# Patient Record
Sex: FEMALE | Race: WHITE | NOT HISPANIC OR LATINO | Employment: FULL TIME | URBAN - METROPOLITAN AREA
[De-identification: names, ages, dates, MRNs, and addresses within clinical notes are randomized per-mention and may not be internally consistent; named-entity substitution may affect disease eponyms.]

---

## 2017-12-16 ENCOUNTER — OFFICE VISIT (OUTPATIENT)
Dept: URGENT CARE | Facility: CLINIC | Age: 47
End: 2017-12-16
Payer: COMMERCIAL

## 2017-12-16 VITALS
HEART RATE: 91 BPM | BODY MASS INDEX: 25.33 KG/M2 | OXYGEN SATURATION: 98 % | TEMPERATURE: 98 F | RESPIRATION RATE: 18 BRPM | DIASTOLIC BLOOD PRESSURE: 84 MMHG | SYSTOLIC BLOOD PRESSURE: 119 MMHG | WEIGHT: 152 LBS | HEIGHT: 65 IN

## 2017-12-16 DIAGNOSIS — J32.4 PANSINUSITIS, UNSPECIFIED CHRONICITY: Primary | ICD-10-CM

## 2017-12-16 DIAGNOSIS — R05.9 COUGH: ICD-10-CM

## 2017-12-16 LAB
CTP QC/QA: YES
CTP QC/QA: YES
FLUAV AG NPH QL: NEGATIVE
FLUBV AG NPH QL: NEGATIVE
S PYO RRNA THROAT QL PROBE: NEGATIVE

## 2017-12-16 PROCEDURE — 87880 STREP A ASSAY W/OPTIC: CPT | Mod: QW,S$GLB,, | Performed by: NURSE PRACTITIONER

## 2017-12-16 PROCEDURE — 96372 THER/PROPH/DIAG INJ SC/IM: CPT | Mod: S$GLB,,, | Performed by: EMERGENCY MEDICINE

## 2017-12-16 PROCEDURE — 87804 INFLUENZA ASSAY W/OPTIC: CPT | Mod: 59,QW,S$GLB, | Performed by: NURSE PRACTITIONER

## 2017-12-16 PROCEDURE — 99203 OFFICE O/P NEW LOW 30 MIN: CPT | Mod: 25,S$GLB,, | Performed by: NURSE PRACTITIONER

## 2017-12-16 RX ORDER — PROMETHAZINE HYDROCHLORIDE AND DEXTROMETHORPHAN HYDROBROMIDE 6.25; 15 MG/5ML; MG/5ML
5 SYRUP ORAL
Qty: 118 ML | Refills: 0 | Status: SHIPPED | OUTPATIENT
Start: 2017-12-16

## 2017-12-16 RX ORDER — AZITHROMYCIN 250 MG/1
TABLET, FILM COATED ORAL
Qty: 6 TABLET | Refills: 0 | Status: SHIPPED | OUTPATIENT
Start: 2017-12-16 | End: 2018-10-22 | Stop reason: ALTCHOICE

## 2017-12-16 RX ORDER — BETAMETHASONE SODIUM PHOSPHATE AND BETAMETHASONE ACETATE 3; 3 MG/ML; MG/ML
6 INJECTION, SUSPENSION INTRA-ARTICULAR; INTRALESIONAL; INTRAMUSCULAR; SOFT TISSUE
Status: COMPLETED | OUTPATIENT
Start: 2017-12-16 | End: 2017-12-16

## 2017-12-16 RX ADMIN — BETAMETHASONE SODIUM PHOSPHATE AND BETAMETHASONE ACETATE 6 MG: 3; 3 INJECTION, SUSPENSION INTRA-ARTICULAR; INTRALESIONAL; INTRAMUSCULAR; SOFT TISSUE at 11:12

## 2017-12-16 NOTE — PROGRESS NOTES
"Subjective:       Patient ID: Irasema Brand is a 47 y.o. female.    Vitals:  height is 5' 5" (1.651 m) and weight is 68.9 kg (152 lb). Her tympanic temperature is 97.8 °F (36.6 °C). Her blood pressure is 119/84 and her pulse is 91. Her respiration is 18 and oxygen saturation is 98%.     Chief Complaint: Sore Throat (congestion headache since 3-4 days )    Present with cough and sore throat for 3-4 days.        Sore Throat    This is a new problem. The current episode started in the past 7 days (3-4 days ). The problem has been gradually improving. There has been no fever. Associated symptoms include congestion, coughing, headaches, a hoarse voice and trouble swallowing. Pertinent negatives include no abdominal pain, ear pain or shortness of breath. Treatments tried: sudafed mucinex and excedrine  The treatment provided mild relief.     Review of Systems   Constitution: Positive for malaise/fatigue. Negative for chills and fever.   HENT: Positive for congestion, hoarse voice, sore throat and trouble swallowing. Negative for ear pain.    Eyes: Negative for discharge and redness.   Cardiovascular: Negative for chest pain, dyspnea on exertion and leg swelling.   Respiratory: Positive for cough. Negative for shortness of breath, sputum production and wheezing.    Musculoskeletal: Positive for myalgias.   Gastrointestinal: Negative for abdominal pain and nausea.   Neurological: Positive for headaches.       Objective:      Physical Exam   Constitutional: She is oriented to person, place, and time. She appears well-developed and well-nourished. She is cooperative.  Non-toxic appearance. She does not appear ill. No distress.   HENT:   Head: Normocephalic and atraumatic.   Right Ear: Hearing, external ear and ear canal normal. Tympanic membrane is injected.   Left Ear: Hearing, external ear and ear canal normal. Tympanic membrane is injected.   Nose: Mucosal edema present. No rhinorrhea or nasal deformity. No epistaxis. " Right sinus exhibits maxillary sinus tenderness and frontal sinus tenderness. Left sinus exhibits maxillary sinus tenderness and frontal sinus tenderness.   Mouth/Throat: Uvula is midline and mucous membranes are normal. No trismus in the jaw. Normal dentition. No uvula swelling. Posterior oropharyngeal erythema (MILD WITH COBBLESTONING) present.   Eyes: Conjunctivae and lids are normal. No scleral icterus.   Sclera clear bilat   Neck: Trachea normal, full passive range of motion without pain and phonation normal. Neck supple.   Cardiovascular: Normal rate, regular rhythm, normal heart sounds, intact distal pulses and normal pulses.    Pulmonary/Chest: Effort normal and breath sounds normal. No respiratory distress.   Abdominal: Soft. Normal appearance and bowel sounds are normal. She exhibits no distension. There is no tenderness.   Musculoskeletal: Normal range of motion. She exhibits no edema or deformity.   Neurological: She is alert and oriented to person, place, and time. She exhibits normal muscle tone. Coordination normal.   Skin: Skin is warm, dry and intact. She is not diaphoretic. No pallor.   Psychiatric: She has a normal mood and affect. Her speech is normal and behavior is normal. Judgment and thought content normal. Cognition and memory are normal.   Nursing note and vitals reviewed.      Office Visit on 12/16/2017   Component Date Value Ref Range Status    Rapid Strep A Screen 12/16/2017 Negative  Negative Final     Acceptable 12/16/2017 Yes   Final    Rapid Influenza A Ag 12/16/2017 Negative  Negative Final    Rapid Influenza B Ag 12/16/2017 Negative  Negative Final     Acceptable 12/16/2017 Yes   Final     Assessment:       1. Pansinusitis, unspecified chronicity    2. Cough        Plan:         Pansinusitis, unspecified chronicity  -     betamethasone acetate-betamethasone sodium phosphate injection 6 mg; Inject 1 mL (6 mg total) into the muscle one time.  -      promethazine-dextromethorphan (PROMETHAZINE-DM) 6.25-15 mg/5 mL Syrp; Take 5 mLs by mouth every 4 to 6 hours as needed (COUGH).  Dispense: 118 mL; Refill: 0  -     azithromycin (ZITHROMAX Z-AMILCAR) 250 MG tablet; TAKE 2 TABLETS ON DAY 1, THEN 1 TABLET DAILY X 4 DAYS.  Dispense: 6 tablet; Refill: 0    Cough  -     POCT rapid strep A  -     POCT Influenza A/B      Patient Instructions   Please drink plenty of fluids.  Please get plenty of rest.  Please return here or go to the Emergency Department for any concerns or worsening of condition.  If you were given wait & see antibiotics, please wait 3-5 days before taking them, and only take them if your symptoms have worsened or not improved.  If you do begin taking the antibiotics, please take them to completion.  If you were prescribed antibiotics, please take them to completion.  If you were prescribed a narcotic medication, do not drive or operate heavy equipment or machinery while taking these medications.  If you do not have Hypertension or any history of palpitations, it is ok to take over the counter Sudafed or Mucinex D or Allegra-D or Claritin-D or Zyrtec-D.  If you do take one of the above, it is ok to combine that with plain over the counter Mucinex or Allegra or Claritin or Zyrtec.  If for example you are taking Zyrtec -D, you can combine that with Mucinex, but not Mucinex-D.  If you are taking Mucinex-D, you can combine that with plain Allegra or Claritin or Zyrtec.   If you do have Hypertension or palpitations, it is safe to take Coricidin HBP for relief of sinus symptoms.  We recommend you take over the counter Flonase (Fluticasone) or another nasally inhaled steroid unless you are already taking one.  Nasal irrigation with a saline spray or Netti Pot like device per their directions is also recommended.  If not allergic, please take over the counter Tylenol (Acetaminophen) and/or Motrin (Ibuprofen) as directed for control of pain and/or fever.  Please follow  up with your primary care doctor or specialist as needed.    If you  smoke, please stop smoking.  Sinusitis (Antibiotic Treatment)    The sinuses are air-filled spaces within the bones of the face. They connect to the inside of the nose. Sinusitis is an inflammation of the tissue lining the sinus cavity. Sinus inflammation can occur during a cold. It can also be due to allergies to pollens and other particles in the air. Sinusitis can cause symptoms of sinus congestion and fullness. A sinus infection causes fever, headache and facial pain. There is often green or yellow drainage from the nose or into the back of the throat (post-nasal drip). You have been given antibiotics to treat this condition.  Home care:  · Take the full course of antibiotics as instructed. Do not stop taking them, even if you feel better.  · Drink plenty of water, hot tea, and other liquids. This may help thin mucus. It also may promote sinus drainage.  · Heat may help soothe painful areas of the face. Use a towel soaked in hot water. Or,  the shower and direct the hot spray onto your face. Using a vaporizer along with a menthol rub at night may also help.   · An expectorant containing guaifenesin may help thin the mucus and promote drainage from the sinuses.  · Over-the-counter decongestants may be used unless a similar medicine was prescribed. Nasal sprays work the fastest. Use one that contains phenylephrine or oxymetazoline. First blow the nose gently. Then use the spray. Do not use these medicines more often than directed on the label or symptoms may get worse. You may also use tablets containing pseudoephedrine. Avoid products that combine ingredients, because side effects may be increased. Read labels. You can also ask the pharmacist for help. (NOTE: Persons with high blood pressure should not use decongestants. They can raise blood pressure.)  · Over-the-counter antihistamines may help if allergies contributed to your  sinusitis.    · Do not use nasal rinses or irrigation during an acute sinus infection, unless told to by your health care provider. Rinsing may spread the infection to other sinuses.  · Use acetaminophen or ibuprofen to control pain, unless another pain medicine was prescribed. (If you have chronic liver or kidney disease or ever had a stomach ulcer, talk with your doctor before using these medicines. Aspirin should never be used in anyone under 18 years of age who is ill with a fever. It may cause severe liver damage.)  · Don't smoke. This can worsen symptoms.  Follow-up care  Follow up with your healthcare provider or our staff if you are not improving within the next week.  When to seek medical advice  Call your healthcare provider if any of these occur:  · Facial pain or headache becoming more severe  · Stiff neck  · Unusual drowsiness or confusion  · Swelling of the forehead or eyelids  · Vision problems, including blurred or double vision  · Fever of 100.4ºF (38ºC) or higher, or as directed by your healthcare provider  · Seizure  · Breathing problems  · Symptoms not resolving within 10 days  Date Last Reviewed: 4/13/2015  © 9552-9913 [employer]. [address] [place] [address]. All rights reserved. This information is not intended as a substitute for professional medical care. Always follow your healthcare professional's instructions.

## 2017-12-16 NOTE — PATIENT INSTRUCTIONS
Please drink plenty of fluids.  Please get plenty of rest.  Please return here or go to the Emergency Department for any concerns or worsening of condition.  If you were given wait & see antibiotics, please wait 3-5 days before taking them, and only take them if your symptoms have worsened or not improved.  If you do begin taking the antibiotics, please take them to completion.  If you were prescribed antibiotics, please take them to completion.  If you were prescribed a narcotic medication, do not drive or operate heavy equipment or machinery while taking these medications.  If you do not have Hypertension or any history of palpitations, it is ok to take over the counter Sudafed or Mucinex D or Allegra-D or Claritin-D or Zyrtec-D.  If you do take one of the above, it is ok to combine that with plain over the counter Mucinex or Allegra or Claritin or Zyrtec.  If for example you are taking Zyrtec -D, you can combine that with Mucinex, but not Mucinex-D.  If you are taking Mucinex-D, you can combine that with plain Allegra or Claritin or Zyrtec.   If you do have Hypertension or palpitations, it is safe to take Coricidin HBP for relief of sinus symptoms.  We recommend you take over the counter Flonase (Fluticasone) or another nasally inhaled steroid unless you are already taking one.  Nasal irrigation with a saline spray or Netti Pot like device per their directions is also recommended.  If not allergic, please take over the counter Tylenol (Acetaminophen) and/or Motrin (Ibuprofen) as directed for control of pain and/or fever.  Please follow up with your primary care doctor or specialist as needed.    If you  smoke, please stop smoking.  Sinusitis (Antibiotic Treatment)    The sinuses are air-filled spaces within the bones of the face. They connect to the inside of the nose. Sinusitis is an inflammation of the tissue lining the sinus cavity. Sinus inflammation can occur during a cold. It can also be due to allergies to  pollens and other particles in the air. Sinusitis can cause symptoms of sinus congestion and fullness. A sinus infection causes fever, headache and facial pain. There is often green or yellow drainage from the nose or into the back of the throat (post-nasal drip). You have been given antibiotics to treat this condition.  Home care:  · Take the full course of antibiotics as instructed. Do not stop taking them, even if you feel better.  · Drink plenty of water, hot tea, and other liquids. This may help thin mucus. It also may promote sinus drainage.  · Heat may help soothe painful areas of the face. Use a towel soaked in hot water. Or,  the shower and direct the hot spray onto your face. Using a vaporizer along with a menthol rub at night may also help.   · An expectorant containing guaifenesin may help thin the mucus and promote drainage from the sinuses.  · Over-the-counter decongestants may be used unless a similar medicine was prescribed. Nasal sprays work the fastest. Use one that contains phenylephrine or oxymetazoline. First blow the nose gently. Then use the spray. Do not use these medicines more often than directed on the label or symptoms may get worse. You may also use tablets containing pseudoephedrine. Avoid products that combine ingredients, because side effects may be increased. Read labels. You can also ask the pharmacist for help. (NOTE: Persons with high blood pressure should not use decongestants. They can raise blood pressure.)  · Over-the-counter antihistamines may help if allergies contributed to your sinusitis.    · Do not use nasal rinses or irrigation during an acute sinus infection, unless told to by your health care provider. Rinsing may spread the infection to other sinuses.  · Use acetaminophen or ibuprofen to control pain, unless another pain medicine was prescribed. (If you have chronic liver or kidney disease or ever had a stomach ulcer, talk with your doctor before using these  medicines. Aspirin should never be used in anyone under 18 years of age who is ill with a fever. It may cause severe liver damage.)  · Don't smoke. This can worsen symptoms.  Follow-up care  Follow up with your healthcare provider or our staff if you are not improving within the next week.  When to seek medical advice  Call your healthcare provider if any of these occur:  · Facial pain or headache becoming more severe  · Stiff neck  · Unusual drowsiness or confusion  · Swelling of the forehead or eyelids  · Vision problems, including blurred or double vision  · Fever of 100.4ºF (38ºC) or higher, or as directed by your healthcare provider  · Seizure  · Breathing problems  · Symptoms not resolving within 10 days  Date Last Reviewed: 4/13/2015  © 7829-9863 [employer]. [address] [place] [address], [place] [address]. All rights reserved. This information is not intended as a substitute for professional medical care. Always follow your healthcare professional's instructions.

## 2018-10-22 ENCOUNTER — OFFICE VISIT (OUTPATIENT)
Dept: URGENT CARE | Facility: CLINIC | Age: 48
End: 2018-10-22
Payer: COMMERCIAL

## 2018-10-22 VITALS
RESPIRATION RATE: 18 BRPM | HEIGHT: 65 IN | HEART RATE: 74 BPM | WEIGHT: 155 LBS | SYSTOLIC BLOOD PRESSURE: 112 MMHG | BODY MASS INDEX: 25.83 KG/M2 | OXYGEN SATURATION: 99 % | TEMPERATURE: 99 F | DIASTOLIC BLOOD PRESSURE: 69 MMHG

## 2018-10-22 DIAGNOSIS — S52.502A CLOSED FRACTURE OF DISTAL END OF LEFT RADIUS, UNSPECIFIED FRACTURE MORPHOLOGY, INITIAL ENCOUNTER: Primary | ICD-10-CM

## 2018-10-22 PROCEDURE — 99214 OFFICE O/P EST MOD 30 MIN: CPT | Mod: 25,S$GLB,, | Performed by: PHYSICIAN ASSISTANT

## 2018-10-22 PROCEDURE — 73110 X-RAY EXAM OF WRIST: CPT | Mod: LT,S$GLB,, | Performed by: RADIOLOGY

## 2018-10-22 PROCEDURE — 29105 APPLICATION LONG ARM SPLINT: CPT | Mod: LT,S$GLB,, | Performed by: PHYSICIAN ASSISTANT

## 2018-10-22 RX ORDER — OMEPRAZOLE 40 MG/1
CAPSULE, DELAYED RELEASE ORAL
COMMUNITY

## 2018-10-22 NOTE — PATIENT INSTRUCTIONS
- Rest.    - Drink plenty of fluids.    - Tylenol or Ibuprofen as directed as needed for fever/pain.    - Ice for the next 24-48 hours.    - Elevate when possible.    - Wear splint until your orthopedic follow up appointment.  - Follow up with your PCP or specialty clinic as directed in the next 1-2 weeks if not improved or as needed.  You can call [phone] to schedule an appointment with the appropriate provider.    - Go to the ED if your symptoms worsen.  - You must understand that you have received an Urgent Care treatment only and that you may be released before all of your medical problems are known or treated.   - You, the patient, will arrange for follow up care as instructed.   - If your condition worsens or fails to improve we recommend that you receive another evaluation at the ER immediately or contact your PCP to discuss your concerns or return here.       Understanding a Distal Radius Fracture      A fracture is a broken bone. A fracture in the distal radius is a break in the lower end of the radius. This is the larger bone in the forearm. Because the break occurs near the wrist, it is often called a wrist fracture.    The bone may be cracked, or it may be broken into 2 or more pieces. The pieces of bone may be lined up or they may have moved out of place. Sometimes, the bone may break through the skin. Nearby nerves, tissues, and joints also may be damaged. Depending on the severity of the fracture, healing may take several months or longer.  What causes a distal radius fracture?  This type of fracture is most often caused from a fall on an outstretched hand. It can also be caused from a blow, accident, or sports injury.  Symptoms of a distal radius fracture  Symptoms can include pain, swelling, and bruising. If the bone breaks through the skin, external bleeding can also occur. The wrist may look crooked, deformed, or bent. It may be hard to move or use the arm, wrist, and hand for normal tasks  and activities.  Treating a distal radius fracture  Treatment depends on how serious the fracture is. If needed, the bone is put back into place. This may be done with or without surgery. If surgery is needed, the surgeon may use devices such as pins, plates, or screws to hold the bone together. You may need to wear a splint or cast for a month or longer to protect the bone and keep it in place during healing. Other treatments may be also used to help reduce symptoms or regain function. These include:  · Cold packs. Putting an ice pack on the injured area may help reduce swelling and pain.  · Raising the arm and wrist. Keeping the arm and wrist raised above heart level may help reduce swelling.  · Pain medicines. Taking prescription or over-the-counter pain medicines may help reduce pain and swelling.  · Exercises. Doing certain exercises at home or with a physical therapist can help restore strength, flexibility, and range of motion in your arm, wrist, and hand. In general, exercises are not started until after the splint or cast is removed.  Possible complications of a distal radius fracture  These can include:  · Poor healing of the bone  · Weakness, stiffness, or loss of range of motion in the arm, wrist, or hand  · Osteoarthritis in the wrist joint  When to call your healthcare provider  Call your healthcare provider right away if you have any of these:  · Fever of 100.4°F (38°C) or higher, or as directed  · Symptoms that dont get better with treatment, or get worse  · Numbness, coldness, or swelling in your arm, hand, or fingers  · Fingernails that turn blue or gray in color  · A splint or cast that is damaged or feels too tight or loose  · New symptoms   Date Last Reviewed: 3/10/2016  © 8406-4908 [employer]. [address] [place] [address]. All rights reserved. This information is not intended as a substitute for professional medical care. Always follow your healthcare professional's  instructions.        Upper Extremity Fracture    You have a break (fracture) of the arm, wrist, or hand. This may be a small crack in the bone. Or it may be a major break, with the broken parts pushed out of position. Most fractures will heal without surgery. But you may need surgery if the bones are far out of place or if the break is near the elbow. Treatment is with a special sling called a shoulder immobilizer, or a splint or cast, depending on the type of fracture and where the fracture is. This fracture takes 4 to 6 weeks or longer to heal. The cast may need to be changed in 2 to 3 weeks as swelling goes down.  Home care  Follow these guidelines when caring for yourself:  · If you were given a shoulder immobilizer, leave it in place. This will support the injured arm at your side. This is the best position for bone healing. The shoulder immobilizer can be adjusted. If it becomes loose, adjust it so that your forearm is level with the ground (horizontal). Your hand should be level with your elbow.  · Put an ice pack on the injured area. Do this for 20 minutes every 1 to 2 hours the first day for pain relief. You can make an ice pack by wrapping a plastic bag of ice cubes in a thin towel. As the ice melts, be careful that the cast/splint/sling doesnt get wet. You can put the ice pack inside the sling and directly over the splint or cast. Continue using the ice pack 3 to 4 times a day for the next 2 days. Then use the ice pack as needed to ease pain and swelling.  · Keep the cast, splint, or sling completely dry at all times. Bathe with your cast, splint, or sling out of the water. Protect it with a large plastic bag, rubber-banded at the top end. If a fiberglass cast, splint, or sling gets wet, you can dry it with a hair dryer.  · You may use acetaminophen or ibuprofen to control pain, unless another pain medicine was prescribed. If you have chronic liver or kidney disease, talk with your healthcare provider  before using these medicines. Also talk with your provider if youve had a stomach ulcer or gastrointestinal bleeding.  · Dont put creams or objects under the cast if you have itching.  Follow-up care  Follow up with your healthcare provider, or as advised. This is to make sure the bone is healing the way it should.  X-rays may be taken. You will be told of any new findings that may affect your care.  When to seek medical advice  Call your health care provider right away if any of these occur:  · The cast or splint cracks  · The plaster cast or splint becomes wet or soft  · The fiberglass cast or splint stays wet for more than 24 hours  · Bad odor from the cast or wound fluid stains the cast  · Tightness or pain under the cast or splint gets worse  · Fingers become swollen, cold, blue, numb, or tingly  · You cant move your fingers  · Skin around cast or splint becomes red  · Fever of 100.4ºF (38ºC) or higher, or as directed by your healthcare provider  Date Last Reviewed: 2/1/2017 © 2000-2017 [employer]. [address] [place] [address], [place] [address]. All rights reserved. This information is not intended as a substitute for professional medical care. Always follow your healthcare professional's instructions.

## 2018-10-22 NOTE — PROGRESS NOTES
"Subjective:       Patient ID: Irasema Brand is a 48 y.o. female.    Vitals:  height is 5' 5" (1.651 m) and weight is 70.3 kg (155 lb). Her oral temperature is 98.9 °F (37.2 °C). Her blood pressure is 112/69 and her pulse is 74. Her respiration is 18 and oxygen saturation is 99%.     Chief Complaint: Wrist Pain    This is a 48 y.o. female who presents today with a chief complaint of left wrist pain after slipping on wet tiles around her pool Saturday. Ice applied continously and she took a oxycodone.      Wrist Pain    The pain is present in the left wrist. This is a new problem. The current episode started in the past 7 days. The problem occurs constantly. The problem has been gradually worsening. The quality of the pain is described as aching. The pain is at a severity of 7/10. The pain is moderate. Associated symptoms include joint swelling, a limited range of motion and stiffness. Pertinent negatives include no numbness. The symptoms are aggravated by activity. She has tried cold and oral narcotics for the symptoms. The treatment provided mild relief. Her past medical history is significant for osteoarthritis.     Review of Systems   Constitution: Negative for weakness and malaise/fatigue.   HENT: Negative for nosebleeds.    Cardiovascular: Negative for chest pain and syncope.   Respiratory: Negative for shortness of breath.    Musculoskeletal: Positive for falls, joint pain, joint swelling, myalgias and stiffness. Negative for back pain and neck pain.   Gastrointestinal: Negative for abdominal pain.   Genitourinary: Negative for hematuria.   Neurological: Negative for dizziness and numbness.       Objective:      Physical Exam   Constitutional: She is oriented to person, place, and time. She appears well-developed and well-nourished.   HENT:   Head: Normocephalic and atraumatic.   Eyes: Conjunctivae are normal.   Neck: Normal range of motion. Neck supple. No thyromegaly present.   Cardiovascular: Normal " rate and regular rhythm. Exam reveals no gallop and no friction rub.   No murmur heard.  Pulmonary/Chest: Effort normal and breath sounds normal. She has no wheezes. She has no rales.   Musculoskeletal:        Left elbow: She exhibits normal range of motion. No tenderness found.        Left wrist: She exhibits decreased range of motion, bony tenderness and swelling. She exhibits no deformity.   Lymphadenopathy:     She has no cervical adenopathy.   Neurological: She is alert and oriented to person, place, and time.   Skin: Skin is warm and dry. No rash noted. No erythema.   Psychiatric: She has a normal mood and affect. Her behavior is normal. Judgment and thought content normal.   Nursing note and vitals reviewed.      8:59 AM - xray of the left wrist shows a nondisplaced fracture of the distal radius.  She was placed in a sugartong splint.    X-ray Wrist Complete Left    Result Date: 10/22/2018  EXAMINATION: XR WRIST COMPLETE 3 VIEWS LEFT CLINICAL HISTORY: Injury, unspecified, initial encounter TECHNIQUE: PA, lateral, and oblique views of the left wrist were performed. COMPARISON: None FINDINGS: There is an incomplete vertical intra-articular fracture of the distal metaphysis of the radius.  No dislocation of the radiocarpal joint.  The ulna is intact.  There is soft tissue swelling especially over the volar aspect of the wrist joint as well as minimal soft tissue swelling over the dorsum of the wrist.  No carpal bone fractures.     1. Acute intra-articular distal radial fracture without significant displacements as above. This report was flagged in Epic as abnormal. Electronically signed by: Gautam Mejia MD Date:    10/22/2018 Time:    09:04      Assessment:       1. Closed fracture of distal end of left radius, unspecified fracture morphology, initial encounter        Plan:         Closed fracture of distal end of left radius, unspecified fracture morphology, initial encounter  -     X-Ray Wrist Complete Left;  Future; Expected date: 10/22/2018  -     Ambulatory referral to Orthopedics        Irasema was seen today for wrist pain.    Diagnoses and all orders for this visit:    Closed fracture of distal end of left radius, unspecified fracture morphology, initial encounter  -     X-Ray Wrist Complete Left; Future  -     Ambulatory referral to Orthopedics      Patient Instructions     - Rest.    - Drink plenty of fluids.    - Tylenol or Ibuprofen as directed as needed for fever/pain.    - Ice for the next 24-48 hours.    - Elevate when possible.    - Wear splint until your orthopedic follow up appointment.  - Follow up with your PCP or specialty clinic as directed in the next 1-2 weeks if not improved or as needed.  You can call [phone] to schedule an appointment with the appropriate provider.    - Go to the ED if your symptoms worsen.  - You must understand that you have received an Urgent Care treatment only and that you may be released before all of your medical problems are known or treated.   - You, the patient, will arrange for follow up care as instructed.   - If your condition worsens or fails to improve we recommend that you receive another evaluation at the ER immediately or contact your PCP to discuss your concerns or return here.       Understanding a Distal Radius Fracture      A fracture is a broken bone. A fracture in the distal radius is a break in the lower end of the radius. This is the larger bone in the forearm. Because the break occurs near the wrist, it is often called a wrist fracture.    The bone may be cracked, or it may be broken into 2 or more pieces. The pieces of bone may be lined up or they may have moved out of place. Sometimes, the bone may break through the skin. Nearby nerves, tissues, and joints also may be damaged. Depending on the severity of the fracture, healing may take several months or longer.  What causes a distal radius fracture?  This type of fracture is most often caused from  a fall on an outstretched hand. It can also be caused from a blow, accident, or sports injury.  Symptoms of a distal radius fracture  Symptoms can include pain, swelling, and bruising. If the bone breaks through the skin, external bleeding can also occur. The wrist may look crooked, deformed, or bent. It may be hard to move or use the arm, wrist, and hand for normal tasks and activities.  Treating a distal radius fracture  Treatment depends on how serious the fracture is. If needed, the bone is put back into place. This may be done with or without surgery. If surgery is needed, the surgeon may use devices such as pins, plates, or screws to hold the bone together. You may need to wear a splint or cast for a month or longer to protect the bone and keep it in place during healing. Other treatments may be also used to help reduce symptoms or regain function. These include:  · Cold packs. Putting an ice pack on the injured area may help reduce swelling and pain.  · Raising the arm and wrist. Keeping the arm and wrist raised above heart level may help reduce swelling.  · Pain medicines. Taking prescription or over-the-counter pain medicines may help reduce pain and swelling.  · Exercises. Doing certain exercises at home or with a physical therapist can help restore strength, flexibility, and range of motion in your arm, wrist, and hand. In general, exercises are not started until after the splint or cast is removed.  Possible complications of a distal radius fracture  These can include:  · Poor healing of the bone  · Weakness, stiffness, or loss of range of motion in the arm, wrist, or hand  · Osteoarthritis in the wrist joint  When to call your healthcare provider  Call your healthcare provider right away if you have any of these:  · Fever of 100.4°F (38°C) or higher, or as directed  · Symptoms that dont get better with treatment, or get worse  · Numbness, coldness, or swelling in your arm, hand, or  fingers  · Fingernails that turn blue or gray in color  · A splint or cast that is damaged or feels too tight or loose  · New symptoms   Date Last Reviewed: 3/10/2016  © 9594-2763 [employer]. [address] [place] [address], [place] [address]. All rights reserved. This information is not intended as a substitute for professional medical care. Always follow your healthcare professional's instructions.        Upper Extremity Fracture    You have a break (fracture) of the arm, wrist, or hand. This may be a small crack in the bone. Or it may be a major break, with the broken parts pushed out of position. Most fractures will heal without surgery. But you may need surgery if the bones are far out of place or if the break is near the elbow. Treatment is with a special sling called a shoulder immobilizer, or a splint or cast, depending on the type of fracture and where the fracture is. This fracture takes 4 to 6 weeks or longer to heal. The cast may need to be changed in 2 to 3 weeks as swelling goes down.  Home care  Follow these guidelines when caring for yourself:  · If you were given a shoulder immobilizer, leave it in place. This will support the injured arm at your side. This is the best position for bone healing. The shoulder immobilizer can be adjusted. If it becomes loose, adjust it so that your forearm is level with the ground (horizontal). Your hand should be level with your elbow.  · Put an ice pack on the injured area. Do this for 20 minutes every 1 to 2 hours the first day for pain relief. You can make an ice pack by wrapping a plastic bag of ice cubes in a thin towel. As the ice melts, be careful that the cast/splint/sling doesnt get wet. You can put the ice pack inside the sling and directly over the splint or cast. Continue using the ice pack 3 to 4 times a day for the next 2 days. Then use the ice pack as needed to ease pain and swelling.  · Keep the cast, splint, or sling completely dry at all  times. Bathe with your cast, splint, or sling out of the water. Protect it with a large plastic bag, rubber-banded at the top end. If a fiberglass cast, splint, or sling gets wet, you can dry it with a hair dryer.  · You may use acetaminophen or ibuprofen to control pain, unless another pain medicine was prescribed. If you have chronic liver or kidney disease, talk with your healthcare provider before using these medicines. Also talk with your provider if youve had a stomach ulcer or gastrointestinal bleeding.  · Dont put creams or objects under the cast if you have itching.  Follow-up care  Follow up with your healthcare provider, or as advised. This is to make sure the bone is healing the way it should.  X-rays may be taken. You will be told of any new findings that may affect your care.  When to seek medical advice  Call your health care provider right away if any of these occur:  · The cast or splint cracks  · The plaster cast or splint becomes wet or soft  · The fiberglass cast or splint stays wet for more than 24 hours  · Bad odor from the cast or wound fluid stains the cast  · Tightness or pain under the cast or splint gets worse  · Fingers become swollen, cold, blue, numb, or tingly  · You cant move your fingers  · Skin around cast or splint becomes red  · Fever of 100.4ºF (38ºC) or higher, or as directed by your healthcare provider  Date Last Reviewed: 2/1/2017 © 2000-2017 [employer]. [address] [place] [address], [place] [address]. All rights reserved. This information is not intended as a substitute for professional medical care. Always follow your healthcare professional's instructions.

## 2018-10-25 ENCOUNTER — HOSPITAL ENCOUNTER (OUTPATIENT)
Dept: RADIOLOGY | Facility: HOSPITAL | Age: 48
Discharge: HOME | End: 2018-10-25
Attending: ORTHOPAEDIC SURGERY
Payer: COMMERCIAL

## 2018-10-25 ENCOUNTER — OFFICE VISIT (OUTPATIENT)
Dept: ORTHOPEDICS | Facility: CLINIC | Age: 48
End: 2018-10-25
Payer: COMMERCIAL

## 2018-10-25 VITALS
WEIGHT: 158.19 LBS | DIASTOLIC BLOOD PRESSURE: 79 MMHG | HEIGHT: 65 IN | BODY MASS INDEX: 26.35 KG/M2 | HEART RATE: 71 BPM | SYSTOLIC BLOOD PRESSURE: 123 MMHG

## 2018-10-25 DIAGNOSIS — S52.572A OTHER CLOSED INTRA-ARTICULAR FRACTURE OF DISTAL END OF LEFT RADIUS, INITIAL ENCOUNTER: Primary | ICD-10-CM

## 2018-10-25 DIAGNOSIS — S52.572A OTHER CLOSED INTRA-ARTICULAR FRACTURE OF DISTAL END OF LEFT RADIUS, INITIAL ENCOUNTER: ICD-10-CM

## 2018-10-25 PROBLEM — S52.502A CLOSED FRACTURE OF LEFT DISTAL RADIUS: Status: ACTIVE | Noted: 2018-10-25

## 2018-10-25 PROCEDURE — 99999 PR PBB SHADOW E&M-EST. PATIENT-LVL III: CPT | Mod: PBBFAC,,, | Performed by: ORTHOPAEDIC SURGERY

## 2018-10-25 PROCEDURE — 99203 OFFICE O/P NEW LOW 30 MIN: CPT | Mod: S$GLB,,, | Performed by: ORTHOPAEDIC SURGERY

## 2018-10-25 PROCEDURE — 73110 X-RAY EXAM OF WRIST: CPT | Mod: TC,LT

## 2018-10-25 PROCEDURE — 73110 X-RAY EXAM OF WRIST: CPT | Mod: 26,LT,, | Performed by: RADIOLOGY

## 2018-10-25 RX ORDER — ASCORBIC ACID 500 MG
500 TABLET ORAL DAILY
Qty: 50 TABLET | Refills: 0 | Status: SHIPPED | OUTPATIENT
Start: 2018-10-25 | End: 2018-11-15

## 2018-10-25 NOTE — PROGRESS NOTES
[facility]  History & Physical  Orthopedics    SUBJECTIVE:      Chief Complaint: Left wrist pain    Referring Provider: Syeda Wallace PA-C     History of Present Illness:  Patient is a 48 y.o. right hand dominant female who presents today with complaints of left wrist pain.  She reports slip and fall on wet ground outside 10/20/18.  She denies N/T and presents for evaluation.     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    Symptoms are aggravated by movement.    Symptoms are alleviated by rest.    Symptoms consist of pain.    The patient rates their pain as a 3/10.    Attempted treatment(s) and/or interventions include rest and splinting/casting.     The patient denies any fevers, chills, N/V, D/C and presents for evaluation.       Past Medical History:   Diagnosis Date    GERD (gastroesophageal reflux disease)     Insulin resistance      Past Surgical History:   Procedure Laterality Date    bladder mesh      BLADDER SUSPENSION      TONSILLECTOMY       Review of patient's allergies indicates:   Allergen Reactions    Hydromorphone Hives    Hydromorphone hcl Hives    Tramadol Nausea Only     Social History     Social History Narrative    Not on file     Family History   Problem Relation Age of Onset    Birth defects Maternal Grandfather     Birth defects Paternal Grandmother     Birth defects Paternal Grandfather     Coronary artery disease Father          Current Outpatient Medications:     diclofenac sodium 1 % Gel, Apply 2 g topically 4 (four) times daily., Disp: 1 Tube, Rfl: 2    ENSKYCE 0.15-0.03 mg per tablet, , Disp: , Rfl:     metformin (GLUCOPHAGE) 1000 MG tablet, , Disp: , Rfl:     nitrofurantoin (MACRODANTIN) 50 MG capsule, , Disp: , Rfl:     omeprazole (PRILOSEC) 40 MG capsule, 1 capsule, Disp: , Rfl:     phentermine (ADIPEX-P) 37.5 mg tablet, TAKE 1 TABLET BY MOUTH EVERY MORNING BEFORE BREAKFAST, Disp:  ", Rfl:     promethazine-dextromethorphan (PROMETHAZINE-DM) 6.25-15 mg/5 mL Syrp, Take 5 mLs by mouth every 4 to 6 hours as needed (COUGH)., Disp: 118 mL, Rfl: 0    temazepam (RESTORIL) 30 mg capsule, , Disp: , Rfl:       Review of Systems:  Constitutional: no fever or chills  Eyes: no visual changes  ENT: no nasal congestion or sore throat  Respiratory: no cough or shortness of breath  Cardiovascular: no chest pain  Gastrointestinal: no nausea or vomiting, tolerating diet  Musculoskeletal: pain    OBJECTIVE:      Vital Signs (Most Recent):  Vitals:    10/25/18 1404   BP: 123/79   Pulse: 71   Weight: 71.7 kg (158 lb 2.9 oz)   Height: 5' 5" (1.651 m)     Body mass index is 26.32 kg/m².      Physical Exam:  Constitutional: The patient appears well-developed and well-nourished. No distress.   Head: Normocephalic and atraumatic.   Nose: Nose normal.   Eyes: Conjunctivae and EOM are normal.   Neck: No tracheal deviation present.   Cardiovascular: Normal rate and intact distal pulses.    Pulmonary/Chest: Effort normal. No respiratory distress.   Abdominal: There is no guarding.   Neurological: The patient is alert.   Psychiatric: The patient has a normal mood and affect.     Left Hand/Wrist Examination:    Observation/Inspection:  Swelling  mild    Deformity  none  Discoloration  none     Scars   none    Atrophy  none    HAND/WRIST EXAMINATION:  Finkelstein's Test   Neg  Snuff box tenderness   Neg  Loza's Test    Neg  Hook of Hamate Tenderness  Neg  CMC grind    Neg  Circumduction test   Neg    Neurovascular Exam:  Digits WWP, brisk CR < 3s throughout  NVI motor/LTS to M/R/U nerves, radial pulse 2+    ROM hand/wrist/elbow not assessed    Diagnostic Results:     Xray - Intraarticular nondisplaced, incomplete left distal radius fracture in good alignment  EMG - none    ASSESSMENT/PLAN:      48 y.o. yo female with left distal radius fracture, nondisplaced  1) R/B of surgical vs closed treatment discussed with patient.  " She elects to proceed with nonsurgical intervention which I feel is reasonable.  2) [place] cast LUE  3) F/U in 3 weeks with new xrays  4) Smoking cessation advised to decrease the risk of nonunion  5) Vit C for CRPS ppx        Oleg Vidal M.D.

## 2018-10-25 NOTE — LETTER
October 25, 2018      Syeda Wallace PA-C  [address]  [facility] [address]           [facility] - Orthopedics  [address]  [facility] [address]  Phone: [phone]          Patient: Irasema Brand   MR Number: [id number]   YOB: 1970   Date of Visit: 10/25/2018       Dear Syeda Wallace:    Thank you for referring Irasema Brand to me for evaluation. Attached you will find relevant portions of my assessment and plan of care.    If you have questions, please do not hesitate to call me. I look forward to following Irasema Brand along with you.    Sincerely,    Oleg Vidal MD    Enclosure  CC:  No Recipients    If you would like to receive this communication electronically, please contact [email or web address] or [phone] to request more information on [employer] Link access.    For providers and/or their staff who would like to refer a patient to [facility], please contact us through our one-stop-shop provider referral line, [facility], at [phone].    If you feel you have received this communication in error or would no longer like to receive these types of communications, please e-mail [email or web address]

## 2018-11-14 ENCOUNTER — TELEPHONE (OUTPATIENT)
Dept: ORTHOPEDICS | Facility: CLINIC | Age: 48
End: 2018-11-14

## 2018-11-14 NOTE — TELEPHONE ENCOUNTER
Called to confirm patient appt for 11/15/2018 at [facility] with Dr Vidal. Patient verbalized understanding of appt time and location.

## 2018-11-15 ENCOUNTER — OFFICE VISIT (OUTPATIENT)
Dept: ORTHOPEDICS | Facility: CLINIC | Age: 48
End: 2018-11-15
Payer: COMMERCIAL

## 2018-11-15 ENCOUNTER — HOSPITAL ENCOUNTER (OUTPATIENT)
Dept: RADIOLOGY | Facility: HOSPITAL | Age: 48
Discharge: HOME | End: 2018-11-15
Attending: ORTHOPAEDIC SURGERY
Payer: COMMERCIAL

## 2018-11-15 VITALS
DIASTOLIC BLOOD PRESSURE: 70 MMHG | SYSTOLIC BLOOD PRESSURE: 112 MMHG | BODY MASS INDEX: 26.33 KG/M2 | HEIGHT: 65 IN | WEIGHT: 158.06 LBS

## 2018-11-15 DIAGNOSIS — S52.572D OTHER CLOSED INTRA-ARTICULAR FRACTURE OF DISTAL END OF LEFT RADIUS WITH ROUTINE HEALING, SUBSEQUENT ENCOUNTER: Primary | ICD-10-CM

## 2018-11-15 DIAGNOSIS — S52.572D OTHER CLOSED INTRA-ARTICULAR FRACTURE OF DISTAL END OF LEFT RADIUS WITH ROUTINE HEALING, SUBSEQUENT ENCOUNTER: ICD-10-CM

## 2018-11-15 PROCEDURE — 99213 OFFICE O/P EST LOW 20 MIN: CPT | Mod: S$GLB,,, | Performed by: ORTHOPAEDIC SURGERY

## 2018-11-15 PROCEDURE — 73110 X-RAY EXAM OF WRIST: CPT | Mod: 26,LT,, | Performed by: RADIOLOGY

## 2018-11-15 PROCEDURE — 99999 PR PBB SHADOW E&M-EST. PATIENT-LVL II: CPT | Mod: PBBFAC,,, | Performed by: ORTHOPAEDIC SURGERY

## 2018-11-15 PROCEDURE — 73110 X-RAY EXAM OF WRIST: CPT | Mod: TC,LT

## 2018-11-15 RX ORDER — ASCORBIC ACID 500 MG
500 TABLET ORAL DAILY
Qty: 50 TABLET | Refills: 0 | Status: SHIPPED | OUTPATIENT
Start: 2018-11-15 | End: 2019-01-04

## 2018-11-15 NOTE — PROGRESS NOTES
[facility]  History & Physical  Orthopedics    SUBJECTIVE:      Chief Complaint: Left wrist pain    Referring Provider: No ref. provider found     History of Present Illness:  Patient is a 48 y.o. right hand dominant female who presents today with complaints of left wrist pain.  She reports slip and fall on wet ground outside 10/20/18.  She denies N/T and presents for evaluation.     Interval history 11/15/18: She returns today for reevaluation.  She has been in a [place] cast which she tolerated well.  No N/T or new complaints.    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    Symptoms are aggravated by movement.    Symptoms are alleviated by rest.    Symptoms consist of pain.    The patient rates their pain as a 3/10.    Attempted treatment(s) and/or interventions include rest and splinting/casting.     The patient denies any fevers, chills, N/V, D/C and presents for evaluation.       Past Medical History:   Diagnosis Date    GERD (gastroesophageal reflux disease)     Insulin resistance      Past Surgical History:   Procedure Laterality Date    bladder mesh      BLADDER SUSPENSION      TONSILLECTOMY       Review of patient's allergies indicates:   Allergen Reactions    Hydromorphone Hives    Hydromorphone hcl Hives    Tramadol Nausea Only     Social History     Social History Narrative    Not on file     Family History   Problem Relation Age of Onset    Birth defects Maternal Grandfather     Birth defects Paternal Grandmother     Birth defects Paternal Grandfather     Coronary artery disease Father          Current Outpatient Medications:     ENSKYCE 0.15-0.03 mg per tablet, , Disp: , Rfl:     metformin (GLUCOPHAGE) 1000 MG tablet, , Disp: , Rfl:     nitrofurantoin (MACRODANTIN) 50 MG capsule, , Disp: , Rfl:     omeprazole (PRILOSEC) 40 MG capsule, 1 capsule, Disp: , Rfl:     phentermine (ADIPEX-P) 37.5 mg tablet, TAKE 1  "TABLET BY MOUTH EVERY MORNING BEFORE BREAKFAST, Disp: , Rfl:     promethazine-dextromethorphan (PROMETHAZINE-DM) 6.25-15 mg/5 mL Syrp, Take 5 mLs by mouth every 4 to 6 hours as needed (COUGH)., Disp: 118 mL, Rfl: 0    temazepam (RESTORIL) 30 mg capsule, , Disp: , Rfl:     ascorbic acid, vitamin C, (VITAMIN C) 500 MG tablet, Take 1 tablet (500 mg total) by mouth once daily., Disp: 50 tablet, Rfl: 0    diclofenac sodium 1 % Gel, Apply 2 g topically 4 (four) times daily., Disp: 1 Tube, Rfl: 2      Review of Systems:  Constitutional: no fever or chills  Eyes: no visual changes  ENT: no nasal congestion or sore throat  Respiratory: no cough or shortness of breath  Cardiovascular: no chest pain  Gastrointestinal: no nausea or vomiting, tolerating diet  Musculoskeletal: pain    OBJECTIVE:      Vital Signs (Most Recent):  Vitals:    11/15/18 1356   BP: 112/70   BP Location: Right arm   Patient Position: Sitting   BP Method: Medium (Automatic)   Weight: 71.7 kg (158 lb 1.1 oz)   Height: 5' 5" (1.651 m)     Body mass index is 26.3 kg/m².      Physical Exam:  Constitutional: The patient appears well-developed and well-nourished. No distress.   Head: Normocephalic and atraumatic.   Nose: Nose normal.   Eyes: Conjunctivae and EOM are normal.   Neck: No tracheal deviation present.   Cardiovascular: Normal rate and intact distal pulses.    Pulmonary/Chest: Effort normal. No respiratory distress.   Abdominal: There is no guarding.   Neurological: The patient is alert.   Psychiatric: The patient has a normal mood and affect.     Left Hand/Wrist Examination:    Observation/Inspection:  Swelling  mild    Deformity  none  Discoloration  none     Scars   none    Atrophy  none    HAND/WRIST EXAMINATION:  Finkelstein's Test   Neg  Snuff box tenderness   Neg  Loza's Test    Neg  Hook of Hamate Tenderness  Neg  CMC grind    Neg  Circumduction test   Neg    Neurovascular Exam:  Digits WWP, brisk CR < 3s throughout  NVI motor/LTS to " M/R/U nerves, radial pulse 2+    ROM hand/wrist/elbow not assessed    Diagnostic Results:     Xray - Intraarticular nondisplaced, incomplete left distal radius fracture in good alignment with no significant change from prior  EMG - none    ASSESSMENT/PLAN:      48 y.o. yo female with left distal radius fracture, nondisplaced  1) Continue closed treatment  2) SAC LUE  3) F/U 3 weeks for XROOC  4) NSAIDs prn pain      Oleg Vidal M.D.

## 2018-11-23 ENCOUNTER — OFFICE VISIT (OUTPATIENT)
Dept: URGENT CARE | Facility: CLINIC | Age: 48
End: 2018-11-23
Payer: COMMERCIAL

## 2018-11-23 VITALS
HEIGHT: 65 IN | TEMPERATURE: 99 F | SYSTOLIC BLOOD PRESSURE: 127 MMHG | RESPIRATION RATE: 20 BRPM | WEIGHT: 155 LBS | DIASTOLIC BLOOD PRESSURE: 85 MMHG | OXYGEN SATURATION: 96 % | HEART RATE: 92 BPM | BODY MASS INDEX: 25.83 KG/M2

## 2018-11-23 DIAGNOSIS — S93.602A FOOT SPRAIN, LEFT, INITIAL ENCOUNTER: Primary | ICD-10-CM

## 2018-11-23 PROCEDURE — 99214 OFFICE O/P EST MOD 30 MIN: CPT | Mod: S$GLB,,, | Performed by: FAMILY MEDICINE

## 2018-11-23 PROCEDURE — 73630 X-RAY EXAM OF FOOT: CPT | Mod: LT,S$GLB,, | Performed by: RADIOLOGY

## 2018-11-23 RX ORDER — TESTOSTERONE CYPIONATE 200 MG/ML
INJECTION, SOLUTION INTRAMUSCULAR
COMMUNITY
Start: 2018-07-19

## 2018-11-23 RX ORDER — NORETHINDRONE ACETATE AND ETHINYL ESTRADIOL 1; 5 MG/1; UG/1
TABLET ORAL
COMMUNITY
Start: 2018-10-18

## 2018-11-23 RX ORDER — SERTRALINE HYDROCHLORIDE 100 MG/1
TABLET, FILM COATED ORAL
COMMUNITY
Start: 2018-11-07

## 2018-11-23 RX ORDER — METHENAMINE HIPPURATE 1000 MG/1
TABLET ORAL
COMMUNITY
Start: 2018-11-07

## 2018-11-23 RX ORDER — MULTIVITAMIN
1 TABLET ORAL
COMMUNITY

## 2018-11-23 RX ORDER — OXYCODONE AND ACETAMINOPHEN 5; 325 MG/1; MG/1
TABLET ORAL
COMMUNITY
Start: 2018-09-26

## 2018-11-23 RX ORDER — LEVOCETIRIZINE DIHYDROCHLORIDE 5 MG/1
5 TABLET, FILM COATED ORAL
COMMUNITY

## 2018-11-23 RX ORDER — METFORMIN HYDROCHLORIDE 1000 MG/1
TABLET ORAL
COMMUNITY
Start: 2017-11-27

## 2018-11-23 RX ORDER — DIAZEPAM 5 MG/1
TABLET ORAL
COMMUNITY
Start: 2018-08-23

## 2018-11-23 RX ORDER — TIZANIDINE 4 MG/1
TABLET ORAL
COMMUNITY
Start: 2018-10-22

## 2018-11-23 RX ORDER — OMEPRAZOLE 20 MG/1
CAPSULE, DELAYED RELEASE ORAL
COMMUNITY
Start: 2018-08-29

## 2018-11-23 RX ORDER — IBUPROFEN 800 MG/1
800 TABLET ORAL EVERY 8 HOURS PRN
Qty: 30 TABLET | Refills: 1 | Status: SHIPPED | OUTPATIENT
Start: 2018-11-23 | End: 2019-11-23

## 2018-11-23 RX ORDER — PHENAZOPYRIDINE HYDROCHLORIDE 100 MG/1
TABLET, FILM COATED ORAL
COMMUNITY
Start: 2018-10-24

## 2018-11-23 RX ORDER — ACYCLOVIR 400 MG/1
TABLET ORAL
COMMUNITY
Start: 2018-09-04

## 2018-11-23 NOTE — PATIENT INSTRUCTIONS
Foot Sprain    A sprain is a stretching or tearing of the ligaments that hold a joint together. There are no broken bones. Sprains generally take from 3-6 weeks to heal. A sprain may be treated with a splint, walking cast, or special boot. Mild sprains may not need any additional support.  Home care  The following guidelines will help you care for your injury at home:  · Keep your leg elevated when sitting or lying down. This is very important during the first 48 hours to reduce swelling. Stay off the injured foot as much as possible until you can walk on it without pain. If needed, you may use crutches during the first week for this purpose. Crutches can be rented at many pharmacies or surgical/orthopedic supply stores.  · You may be given a cast shoe to wear to prevent movement in your foot. If not, you can use a sandal or any shoe that does not put pressure on the injured area until the swelling and pain go away. If using a sandal, be careful not to hit your foot against anything, since another injury could make the sprain worse.  · Apply an ice pack over the injured area for 15 to 20 minutes every 3 to 6 hours. You should do this for the first 24 to 48 hours. You can make an ice pack by filling a plastic bag that seals at the top with ice cubes and then wrapping it with a thin towel. Continue to use ice packs for relief of pain and swelling as needed. As the ice melts, avoid getting any wrap, splint, or cast wet. After 48 hours, apply heat from a warm shower or bath for 20 minutes several times daily. Alternating ice and heat may also be helpful.  · You may use over-the-counter pain medicine to control pain, unless another medicine was prescribed. If you have chronic liver or kidney disease or ever had a stomach ulcer or GI bleeding, talk with your healthcare provider before using these medicines.  · If you were given a splint or cast, keep it dry. Bathe with your splint or cast well out of the water,  protected with 2 large plastic bags, rubber-banded at the top end. If a fiberglass splint or cast gets wet, you can dry it with a hair dryer.  · You may return to sports after healing, when you can run without pain.  Follow-up care  Follow up with your healthcare provider as directed. Sometimes fractures dont show up on the first X-ray. Bruises and sprains can sometimes hurt as much as a fracture. These injuries can take time to heal completely. If your symptoms dont improve or they get worse, talk with your healthcare provider. You may need a repeat X-ray.  When to seek medical advice  Call your healthcare provider right away if any of these occur:  · The plaster cast or splint gets wet or soft  · The fiberglass cast or splint gets wet and does not dry for 24 hours  · Pain or swelling increases, or redness appears  · A bad odor comes from within the cast  · Fever of 100.4°F (38°C) or above lasting for 24 to 48 hours  · Toes on the injured foot become cold, blue, numb, or tingly  Date Last Reviewed: 11/20/2015  © 2993-4351 [employer]. [address] [place] [address], [place] [address]. All rights reserved. This information is not intended as a substitute for professional medical care. Always follow your healthcare professional's instructions.

## 2018-11-23 NOTE — PROGRESS NOTES
"Subjective:       Patient ID: Irasema Brand is a 48 y.o. female.    Vitals:  height is 5' 5" (1.651 m) and weight is 70.3 kg (155 lb). Her oral temperature is 99.1 °F (37.3 °C). Her blood pressure is 127/85 and her pulse is 92. Her respiration is 20 and oxygen saturation is 96%.     Chief Complaint: Foot Injury (Left Foot)    This is a 48 y.o. female who presents today with a chief complaint of left foot injury which happened last night.  Patient states she was getting into a cab when she may have twisted her foot.  Pt is unable to put weight on the foot.  Pt states the pain is on the top of the foot and on the inner side of the foot.  She has taken nothing for this.       Foot Injury    The incident occurred 12 to 24 hours ago. The incident occurred in the street. The injury mechanism was a twisting injury. The pain is present in the left foot. The quality of the pain is described as aching and shooting. The pain is at a severity of 8/10. The pain is severe. The pain has been constant since onset. Associated symptoms include an inability to bear weight. She reports no foreign bodies present. The symptoms are aggravated by movement and weight bearing. She has tried nothing for the symptoms.       Constitution: Negative for chills, fatigue and fever.   HENT: Negative for congestion and sore throat.    Neck: Negative for painful lymph nodes.   Cardiovascular: Negative for chest pain and leg swelling.   Eyes: Negative for double vision and blurred vision.   Respiratory: Negative for cough and shortness of breath.    Gastrointestinal: Negative for nausea, vomiting and diarrhea.   Genitourinary: Negative for dysuria, frequency, urgency and history of kidney stones.   Musculoskeletal: Negative for joint pain, joint swelling, muscle cramps and muscle ache.        Left Foot Pain   Skin: Negative for color change, pale, rash and bruising.   Allergic/Immunologic: Negative for seasonal allergies.   Neurological: Negative " for dizziness, history of vertigo, light-headedness, passing out and headaches.   Hematologic/Lymphatic: Negative for swollen lymph nodes.   Psychiatric/Behavioral: Negative for nervous/anxious, sleep disturbance and depression. The patient is not nervous/anxious.        Objective:      Physical Exam   Constitutional: She appears well-developed and well-nourished.   HENT:   Head: Normocephalic and atraumatic.   Eyes: EOM are normal. Pupils are equal, round, and reactive to light.   Cardiovascular: Normal rate, regular rhythm and normal heart sounds.   Pulmonary/Chest: Effort normal and breath sounds normal.   Musculoskeletal: Normal range of motion. She exhibits tenderness (medial aspect dorsum left foot. No swelling or gross deformities noted. ). She exhibits no edema or deformity.   Nursing note and vitals reviewed.      Assessment:       1. Foot sprain, left, initial encounter        Plan:         Foot sprain, left, initial encounter  -     X-Ray Foot Complete 3 view Left; Future; Expected date: 11/23/2018  -     ibuprofen (ADVIL,MOTRIN) 800 MG tablet; Take 1 tablet (800 mg total) by mouth every 8 (eight) hours as needed for Pain (with food).  Dispense: 30 tablet; Refill: 1

## 2018-12-06 ENCOUNTER — OFFICE VISIT (OUTPATIENT)
Dept: ORTHOPEDICS | Facility: CLINIC | Age: 48
End: 2018-12-06
Payer: COMMERCIAL

## 2018-12-06 ENCOUNTER — HOSPITAL ENCOUNTER (OUTPATIENT)
Dept: RADIOLOGY | Facility: HOSPITAL | Age: 48
Discharge: HOME | End: 2018-12-06
Attending: ORTHOPAEDIC SURGERY
Payer: COMMERCIAL

## 2018-12-06 VITALS
SYSTOLIC BLOOD PRESSURE: 118 MMHG | BODY MASS INDEX: 25.83 KG/M2 | HEIGHT: 65 IN | WEIGHT: 155 LBS | DIASTOLIC BLOOD PRESSURE: 76 MMHG

## 2018-12-06 DIAGNOSIS — S52.572D OTHER CLOSED INTRA-ARTICULAR FRACTURE OF DISTAL END OF LEFT RADIUS WITH ROUTINE HEALING, SUBSEQUENT ENCOUNTER: Primary | ICD-10-CM

## 2018-12-06 DIAGNOSIS — M25.532 LEFT WRIST PAIN: Primary | ICD-10-CM

## 2018-12-06 DIAGNOSIS — M25.532 LEFT WRIST PAIN: ICD-10-CM

## 2018-12-06 PROBLEM — S52.502D CLOSED FRACTURE OF LOWER END OF LEFT RADIUS WITH ROUTINE HEALING: Status: ACTIVE | Noted: 2018-10-25

## 2018-12-06 PROCEDURE — 99999 PR PBB SHADOW E&M-EST. PATIENT-LVL III: CPT | Mod: PBBFAC,,, | Performed by: ORTHOPAEDIC SURGERY

## 2018-12-06 PROCEDURE — 73110 X-RAY EXAM OF WRIST: CPT | Mod: 26,LT,, | Performed by: RADIOLOGY

## 2018-12-06 PROCEDURE — 99213 OFFICE O/P EST LOW 20 MIN: CPT | Mod: S$GLB,,, | Performed by: ORTHOPAEDIC SURGERY

## 2018-12-06 PROCEDURE — 73110 X-RAY EXAM OF WRIST: CPT | Mod: TC,LT

## 2018-12-06 NOTE — PROGRESS NOTES
[facility]  History & Physical  Orthopedics    SUBJECTIVE:      Chief Complaint: Left wrist pain    Referring Provider: No ref. provider found     History of Present Illness:  Patient is a 48 y.o. right hand dominant female who presents today with complaints of left wrist pain.  She reports slip and fall on wet ground outside 10/20/18.  She denies N/T and presents for evaluation.     Interval history 11/15/18: She returns today for reevaluation.  She has been in a [place] cast which she tolerated well.  No N/T or new complaints.    Interval history 12/6/18: She returns today for reevaluation.  She has been in a SAC and doing well.  No new complaints, denies N/T.      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    Symptoms are aggravated by movement.    Symptoms are alleviated by rest.    Symptoms consist of pain.    The patient rates their pain as a 3/10.    Attempted treatment(s) and/or interventions include rest and splinting/casting.     The patient denies any fevers, chills, N/V, D/C and presents for evaluation.       Past Medical History:   Diagnosis Date    GERD (gastroesophageal reflux disease)     Insulin resistance      Past Surgical History:   Procedure Laterality Date    bladder mesh      BLADDER SUSPENSION      TONSILLECTOMY       Review of patient's allergies indicates:   Allergen Reactions    Hydromorphone Hives    Hydromorphone hcl Hives    Tramadol Nausea Only     Social History     Social History Narrative    Not on file     Family History   Problem Relation Age of Onset    Birth defects Maternal Grandfather     Birth defects Paternal Grandmother     Birth defects Paternal Grandfather     No Known Problems Mother     Coronary artery disease Father          Current Outpatient Medications:     acyclovir (ZOVIRAX) 400 MG tablet, , Disp: , Rfl:     ascorbic acid, vitamin C, (VITAMIN C) 500 MG tablet, Take 1  tablet (500 mg total) by mouth once daily., Disp: 50 tablet, Rfl: 0    diazePAM (VALIUM) 5 MG tablet, , Disp: , Rfl:     diclofenac sodium 1 % Gel, Apply 2 g topically 4 (four) times daily., Disp: 1 Tube, Rfl: 2    ENSKYCE 0.15-0.03 mg per tablet, , Disp: , Rfl:     ibuprofen (ADVIL,MOTRIN) 800 MG tablet, Take 1 tablet (800 mg total) by mouth every 8 (eight) hours as needed for Pain (with food)., Disp: 30 tablet, Rfl: 1    levocetirizine (XYZAL) 5 MG tablet, Take 5 mg by mouth., Disp: , Rfl:     metformin (GLUCOPHAGE) 1000 MG tablet, , Disp: , Rfl:     metFORMIN (GLUCOPHAGE) 1000 MG tablet, , Disp: , Rfl:     methenamine (HIPREX) 1 gram Tab, , Disp: , Rfl:     multivitamin (THERAGRAN) per tablet, Take 1 tablet by mouth., Disp: , Rfl:     nitrofurantoin (MACRODANTIN) 50 MG capsule, , Disp: , Rfl:     norethindrone-ethinyl estradiol (FEMHRT 1/5) 1-5 mg-mcg Tab, , Disp: , Rfl:     omeprazole (PRILOSEC) 20 MG capsule, , Disp: , Rfl:     omeprazole (PRILOSEC) 40 MG capsule, 1 capsule, Disp: , Rfl:     oxyCODONE-acetaminophen (PERCOCET) 5-325 mg per tablet, , Disp: , Rfl:     phenazopyridine (PYRIDIUM) 100 MG tablet, , Disp: , Rfl:     phentermine (ADIPEX-P) 37.5 mg tablet, TAKE 1 TABLET BY MOUTH EVERY MORNING BEFORE BREAKFAST, Disp: , Rfl:     promethazine-dextromethorphan (PROMETHAZINE-DM) 6.25-15 mg/5 mL Syrp, Take 5 mLs by mouth every 4 to 6 hours as needed (COUGH)., Disp: 118 mL, Rfl: 0    sertraline (ZOLOFT) 100 MG tablet, , Disp: , Rfl:     temazepam (RESTORIL) 30 mg capsule, , Disp: , Rfl:     testosterone cypionate (DEPOTESTOTERONE CYPIONATE) 200 mg/mL injection, , Disp: , Rfl:     tiZANidine (ZANAFLEX) 4 MG tablet, , Disp: , Rfl:       Review of Systems:  Constitutional: no fever or chills  Eyes: no visual changes  ENT: no nasal congestion or sore throat  Respiratory: no cough or shortness of breath  Cardiovascular: no chest pain  Gastrointestinal: no nausea or vomiting, tolerating  "diet  Musculoskeletal: pain    OBJECTIVE:      Vital Signs (Most Recent):  Vitals:    12/06/18 1449   BP: 118/76   BP Location: Left arm   Patient Position: Sitting   BP Method: Medium (Automatic)   Weight: 70.3 kg (154 lb 15.7 oz)   Height: 5' 5" (1.651 m)     Body mass index is 25.79 kg/m².      Physical Exam:  Constitutional: The patient appears well-developed and well-nourished. No distress.   Head: Normocephalic and atraumatic.   Nose: Nose normal.   Eyes: Conjunctivae and EOM are normal.   Neck: No tracheal deviation present.   Cardiovascular: Normal rate and intact distal pulses.    Pulmonary/Chest: Effort normal. No respiratory distress.   Abdominal: There is no guarding.   Neurological: The patient is alert.   Psychiatric: The patient has a normal mood and affect.     Left Hand/Wrist Examination:    Observation/Inspection:  Swelling  none    Deformity  none  Discoloration  none     Scars   none    Atrophy  none    HAND/WRIST EXAMINATION:  Finkelstein's Test   Neg  Snuff box tenderness   Neg  Loza's Test    Neg  Hook of Hamate Tenderness  Neg  CMC grind    Neg  Circumduction test   Neg    Neurovascular Exam:  Digits WWP, brisk CR < 3s throughout  NVI motor/LTS to M/R/U nerves, radial pulse 2+    Left wrist stiff  Minimal TTP at fracture site    Diagnostic Results:     Xray - Intraarticular nondisplaced, incomplete left distal radius fracture in good alignment with no significant change from prior  EMG - none    ASSESSMENT/PLAN:      48 y.o. yo female with left distal radius fracture, nondisplaced  1) Continue closed treatment  2) Doing well  3) Exos LUE  4) OT referral to begin gentle ROM  5) Remain NWB LUE  6) F/U 6 weeks for reevaluation    Oleg Vidal M.D.  "

## 2018-12-11 ENCOUNTER — CLINICAL SUPPORT (OUTPATIENT)
Dept: REHABILITATION | Facility: HOSPITAL | Age: 48
End: 2018-12-11
Attending: ORTHOPAEDIC SURGERY
Payer: COMMERCIAL

## 2018-12-11 DIAGNOSIS — R29.898 DECREASED GRIP STRENGTH OF LEFT HAND: ICD-10-CM

## 2018-12-11 DIAGNOSIS — M25.532 PAIN IN LEFT WRIST: ICD-10-CM

## 2018-12-11 DIAGNOSIS — M25.60 RANGE OF MOTION DEFICIT: ICD-10-CM

## 2018-12-11 PROCEDURE — 97165 OT EVAL LOW COMPLEX 30 MIN: CPT

## 2018-12-11 PROCEDURE — 97022 WHIRLPOOL THERAPY: CPT

## 2018-12-11 PROCEDURE — 97110 THERAPEUTIC EXERCISES: CPT

## 2018-12-11 NOTE — PATIENT INSTRUCTIONS
[facility], OCCUPATIONAL THERAPY  HOME EXERCISE PROGRAM   Use heat 2 x daily prior to range of motion exercises.    Complete the following exercises with 10 repetitions each, 4x/day.     AROM: Supination / Pronation   With your elbow by your side, turn your palm up then turn your palm down.     AROM: Wrist Flexion / Extension               Bend your wrist forward and back as far as possible.      AROM: Wrist Radial / Ulnar Deviation  Bend your wrist from side to side as far as possible.    AROM: Wrist Flexion / Extension  Make a fist, then bend your wrist forward then back as far as possible.         AROM: Wrist Radial / Ulnar Deviation   Make a fist then bend your wrist toward your body, then away.         Copyright © I. All rights reserved.       Complete the following strengthening exercises using 1 pound weight.  Do 15 repetitions of each, 1x per day.     Resisted Forearm Rotation  Use a weight or a hammer. Slowly rotate hand to one side then the other.      Resisted Wrist Flexion  With palm up and weight in hand, bend wrist up. Return slowly.      Resisted Wrist Extension  With palm down and weight in hand, bend wrist up. Then bend wrist down.      Resisted Wrist Deviation  With thumb up and weight in hand, bend wrist up. Return slowly.                                                 Copyright © [employer]. All rights reserved.     Therapist: SABRINA Fuentes, NABEEL

## 2018-12-11 NOTE — PLAN OF CARE
[facility] Occupational Therapy  Initial Evaluation     Name: Irasema Brand  Clinic Number: [id number]    Therapy Diagnosis:   Encounter Diagnoses   Name Primary?    Pain in left wrist     Range of motion deficit     Decreased  strength of left hand      Physician: Oleg Vidal MD    Physician Orders: Note   Eval and treat to begin ROM left distal radius fracture treated nonoperatively       Medical Diagnosis: S52.572D (ICD-10-CM) - Other closed intra-articular fracture of distal end of left radius with routine healing, subsequent encounter     Surgical Procedure and Date: N/A  Evaluation Date: 2018  Insurance: [facility]  Insurance Authorization period Expiration: 2018     Plan of Care Certification Period: 2018 to 2019    Visit # / Visits Authortized:   Time In:2:00 PM  Time Out: 3:00 PM  Total Billable Time: 60 minutes    Precautions: Standard    Subjective     Involved Side: left  Dominant Side: Right  Date of Onset: 7 weeks ago  Mechanism of Injury: slipped and fell  History of Current Condition: Pt in cast for 6 1/2 weeks, then in removable brace for the past few days.  [email or web address]   INDINGS:  There is a healing distal radial fracture good alignment no complication.          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     Current FunctionalStatus   Home/Living environment : lives with their spouse      Limitation of Functional Status as follows:   ADLs/IADLs:     - Feeding:Independent    -  Bathing: Indpendent    - Dressing/Grooming: Independent2    - Driving: Independent     Leisure: Yoga - unable, Pilates - unable      Past Medical History/Physical Systems Review:   Irasema Brand  has a past medical history of GERD (gastroesophageal reflux disease) and Insulin resistance.    Irasema Brand  has a past surgical history that includes Tonsillectomy; bladder mesh; and Bladder suspension.    Irasema has a current medication list which includes the following prescription(s): acyclovir, ascorbic acid (vitamin c), diazepam, diclofenac sodium, enskyce, ibuprofen, levocetirizine, metformin, metformin, methenamine, multivitamin, nitrofurantoin, norethindrone-ethinyl estradiol, omeprazole, omeprazole, oxycodone-acetaminophen, phenazopyridine, phentermine, promethazine-dextromethorphan, sertraline, temazepam, testosterone cypionate, and tizanidine.    Review of patient's allergies indicates:   Allergen Reactions    Hydromorphone Hives    Hydromorphone hcl Hives    Tramadol Nausea Only          Objective   Observation/Appearance:  Skin intact and no bruising noted    Edema. Measured in centimeters.   12/11/2018 12/11/2018    Left Right   Proximal Wrist Crease 15.3 14.8   Mid palm 18 18.5   MCPs 17.2 18         Hand ROM. Measured in degrees. WNL    Wrist ROM  Date 12/11/2018 12/11/2018    Left Right   Supination/Pronation 80/80 80/80   Wrist ext/flex 50/65 60/70   Wrist RD/UD 20/25 20/35         Sensation: Intact     Strength (Dyanmometer) and Pinch Strength (Pinch Gauge)  Measured in pounds and psi. Average of three trials.   12/11/2018 12/11/2018    Left Right   Rung II 33 78           CMS Impairment/Limitation/Restriction for FOTO Hand Survey    Therapist reviewed FOTO scores for Irasema Brand on 12/11/2018.   FOTO documents entered into [employer] - see Media section.    Limitation Score: 49%  Category: Carrying    Current : CK = at least 40% but < 60% impaired, limited or restricted  Goal: CJ = at  least 20% but < 40% impaired, limited or restricted           Treatment     Treatment Time In: 2:00 PM  Treatment Time Out: 3:00 PM  Total Treatment time separate from Evaluation time:30 min    Irasema received the following supervised modalities after being cleared for contradictions for 15 minutes:   -Patient received fluidotherapy to left hand for 15 minutes to increase blood flow, circulation, desensitization, sensory re-education and for pain management.     Irasema received the following manual therapy techniques for 5 minutes:   -STM to left hand/wrist    Irasema received therapeutic exercises for 10 minutes including:  -AROM as follows: Wrist flex/ext, RD/UD (open and closed fist) and sup/pron x 10 reps each  - red theraputty as follows: 3' molding, 30 grasps  -1 lb wrist 3 ways x 15 reps      Home Exercise Program/Education:  Issued HEP (see patient instructions in EMR) and educated on modality use for pain management . Exercises were reviewed and Irasema was able to demonstrate them prior to the end of the session.   Pt received a written copy of exercises to perform at home. Irasema demonstrated good  understanding of the education provided.  Pt was advised to perform these exercises free of pain, and to stop performing them if pain occurs.    Patient/Family Education: role of OT, goals for OT, scheduling/cancellations - pt verbalized understanding. Discussed insurance limitations with patient.    Additional Education provided: none    Assessment     Irasema Brand is a 48 y.o. female referred to outpatient occupational/hand therapy and presents with a medical diagnosis of 52.572D (ICD-10-CM) - Other closed intra-articular fracture of distal end of left radius with routine healing, subsequent encounter, resulting in left wrist pain, decreased range of motion, decreased strength and decreased functional use of the left hand and demonstrates limitations as described in the chart below. Following a brief medical record  review it is determined that pt will benefit from occupational therapy services in order to maximize pain free and/or functional use of left hand/wrist.     The patient's rehab potential is Excellent.     Anticipated barriers to occupational therapy: none  Pt has no cultural, educational or language barriers to learning provided.    Profile and History Assessment of Occupational Performance Level of Clinical Decision Making Complexity Score   Occupational Profile:   Irasema Brand is a 48 y.o. female who lives with their spouse and is currently employed as a creative . Irasema Brand has difficulty with  Household chores and yoga/pilates  affecting his/her daily functional abilities. His/her main goal for therapy is full range of motion of left wrist.    Comorbidities:    has a past medical history of GERD (gastroesophageal reflux disease) and Insulin resistance.        Medical and Therapy History Review:   Brief               Performance Deficits    Physical:  Joint Mobility  Muscle Power/Strength  Muscle Endurance  Edema   Strength  Pinch Strength  Pain    Cognitive:  No Deficits    Psychosocial:    No Deficits     Clinical Decision Making:  low    Assessment Process:  Problem-Focused Assessments    Modification/Need for Assistance:  Not Necessary    Intervention Selection:  Limited Treatment Options       low  Based on PMHX, co morbidities , data from assessments and functional level of assistance required with task and clinical presentation directly impacting function.       The following goals were discussed with the patient and patient is in agreement with them as to be addressed in the treatment plan.     Goals:   Short Term (4 weeks on 1/10/2019):  1)   Patient to be IND with HEP and modalities for pain management  2)   Increase ROM 5-10 degrees for wrist ext/flex  to increase functional hand use for grasping.  3)   Increase  strength 10-15 lbs. to grasp lift objects  4)    Decrease edema .2-.3 cm to increase joint mobility /flexibility for improved overall functional hand use.       Long Term (by discharge):  1)   Pt will report 0 out of 10 pain with yoga and pilates.  2)   Patient to score at 34% or less on FOTO to demonstrate improved perception of functional left hand use.  3)   Pt will return to prior level of function for ADLs and household management.       Plan   Certification Period/Plan of care expiration: 12/11/2018 to 2/11/2019.    Outpatient Occupational Therapy 1 times weekly for 8 weeks may include the following interventions: Paraffin, Fluidotherapy, Manual therapy/joint mobilizations, Modalities for pain management, US 3 mhz, Therapeutic exercises/activities., Strengthening and Edema Control.      Ivana Woodruff, OT

## 2018-12-21 ENCOUNTER — CLINICAL SUPPORT (OUTPATIENT)
Dept: REHABILITATION | Facility: HOSPITAL | Age: 48
End: 2018-12-21
Attending: ORTHOPAEDIC SURGERY
Payer: COMMERCIAL

## 2018-12-21 DIAGNOSIS — M25.60 RANGE OF MOTION DEFICIT: ICD-10-CM

## 2018-12-21 DIAGNOSIS — M25.532 PAIN IN LEFT WRIST: ICD-10-CM

## 2018-12-21 DIAGNOSIS — R29.898 DECREASED GRIP STRENGTH OF LEFT HAND: ICD-10-CM

## 2018-12-21 PROCEDURE — 97022 WHIRLPOOL THERAPY: CPT

## 2018-12-21 PROCEDURE — 97110 THERAPEUTIC EXERCISES: CPT

## 2018-12-21 NOTE — PROGRESS NOTES
"                            Occupational Therapy Daily Treatment Note     Date: 12/21/2018  Name: Irasema Brand  Clinic Number: [id number]    Therapy Diagnosis:   Encounter Diagnoses   Name Primary?    Pain in left wrist     Range of motion deficit     Decreased  strength of left hand      Physician: Oleg Vidal MD       Physician Orders: Note   Eval and treat to begin ROM left distal radius fracture treated nonoperatively         Medical Diagnosis: S52.572D (ICD-10-CM) - Other closed intra-articular fracture of distal end of left radius with routine healing, subsequent encounter      Surgical Procedure and Date: N/A  Evaluation Date: 12/11/2018  Insurance: [employer]  Insurance Authorization period Expiration: 12/31/2018      Plan of Care Certification Period: 12/11/2018 to 2/11/2019     Visit # / Visits Authortized: 2 / 20  Time In:9:30 AM  Time Out: 10:25 AM  Total Billable Time: 55 minutes  Charges: fluido, 2 TE     Precautions: Standard        Subjective     Pt reports: she was compliant with home exercise program given last session.   Response to previous treatment:"moving much better"  Functional change: Pt reports she is able to wash her hair.    Pain: 3/10  Location: left wrist      Objective   Observation/Appearance:  Skin intact and no bruising noted     Edema. Measured in centimeters.    12/11/2018 12/11/2018     Left Right   Proximal Wrist Crease 15.3 14.8   Mid palm 18 18.5   MCPs 17.2 18            Hand ROM. Measured in degrees. WNL     Wrist ROM  Date 12/11/2018 12/11/2018     Left Right   Supination/Pronation 80/80 80/80   Wrist ext/flex 50/65 60/70   Wrist RD/UD 20/25 20/35            Sensation: Intact      Strength (Dyanmometer) and Pinch Strength (Pinch Gauge)  Measured in pounds and psi. Average of three trials.    12/11/2018 12/11/2018     Left Right   Rung II 33 78         Irasema received the following supervised modalities after being cleared for contradictions for 15 minutes: "   -Patient received fluidotherapy to left hand for 15 minutes to increase blood flow, circulation, desensitization, sensory re-education and for pain management.      Irasema received the following manual therapy techniques for 5 minutes:   -STM to left hand/wrist     Irasema received therapeutic exercises for 35 minutes including:  -AROM as follows: Wrist flex/ext, RD/UD (open and closed fist) and sup/pron x 10 reps each  - green theraputty as follows: 3' molding, 30 grasps  -2 lb wrist 3 ways 2 x 15 reps  -red powerweb wrist extension pushes x 30 reps  -red gripper x 30 reps   -3 yellow hand gripper x 30 reps  - rotation bar with 1 plate x 15 reps  -wrist roller with 1 lb x 5 reps  -yellow powerflex (smiles, frowns and twists) x 30 reps        Home Exercises and Education Provided     Education provided:   - None today  - Progress towards goals: Excellent     Written Home Exercises Provided: Patient instructed to cont prior HEP.  Exercises were reviewed and Irasema was able to demonstrate them prior to the end of the session.  Irasema demonstrated good  understanding of the education provided.     See EMR under Patient Instructions for exercises provided prior visit.     Irasema demonstrated good  understanding of the education provided.         Assessment   Irasema Brand is a 48 y.o. female referred to outpatient occupational/hand therapy and presents with a medical diagnosis of 52.572D (ICD-10-CM) - Other closed intra-articular fracture of distal end of left radius with routine healing, subsequent encounter, resulting in left wrist pain, decreased range of motion, decreased strength and decreased functional use of the left hand. Advanced strengthening ex with no increase in pain. Pain has decreased and pt is becoming more funtional.    Pt would continue to benefit from skilled OT.      Irasema is progressing well towards her goals and there are no updates to goals at this time. Pt prognosis is Excellent.     Pt will continue  to benefit from skilled outpatient occupational therapy to address the deficits listed in the problem list on initial evaluation provide pt/family education and to maximize pt's level of independence in the home and community environment.     Anticipated barriers to occupational therapy: none    Pt's spiritual, cultural and educational needs considered and pt agreeable to plan of care and goals.    Goals:  Short Term (4 weeks on 1/10/2019):  1)   Patient to be IND with HEP and modalities for pain management. Met 12/21/2018  2)   Increase ROM 5-10 degrees for wrist ext/flex  to increase functional hand use for grasping.-progressing  3)   Increase  strength 10-15 lbs. to grasp lift objects-progressing  4)   Decrease edema .2-.3 cm to increase joint mobility /flexibility for improved overall functional hand use. -progressing        Long Term (by discharge):  1)   Pt will report 0 out of 10 pain with yoga and pilates.-progressing  2)   Patient to score at 34% or less on FOTO to demonstrate improved perception of functional left hand use.-progressing  3)   Pt will return to prior level of function for ADLs and household management. -progressinng            Plan   Certification Period/Plan of care expiration: 12/11/2018 to 2/11/2019.     Outpatient Occupational Therapy 1 times weekly for 8 weeks may include the following interventions: Paraffin, Fluidotherapy, Manual therapy/joint mobilizations, Modalities for pain management, US 3 mhz, Therapeutic exercises/activities., Strengthening and Edema Control.          Discussed Plan of Care with patient: Yes  Updates/Grading for next session: Advance as tolerated.      Ivana Woodruff, OT

## 2019-01-04 ENCOUNTER — CLINICAL SUPPORT (OUTPATIENT)
Dept: REHABILITATION | Facility: HOSPITAL | Age: 49
End: 2019-01-04
Attending: ORTHOPAEDIC SURGERY
Payer: COMMERCIAL

## 2019-01-04 DIAGNOSIS — M25.532 PAIN IN LEFT WRIST: ICD-10-CM

## 2019-01-04 DIAGNOSIS — M25.60 RANGE OF MOTION DEFICIT: ICD-10-CM

## 2019-01-04 DIAGNOSIS — R29.898 DECREASED GRIP STRENGTH OF LEFT HAND: ICD-10-CM

## 2019-01-04 PROCEDURE — 97022 WHIRLPOOL THERAPY: CPT

## 2019-01-04 PROCEDURE — 97110 THERAPEUTIC EXERCISES: CPT

## 2019-01-04 NOTE — PROGRESS NOTES
"                            Occupational Therapy Daily Treatment Note     Date: 1/4/2019  Name: Irasema Brand  Clinic Number: [id number]    Therapy Diagnosis:   Encounter Diagnoses   Name Primary?    Pain in left wrist     Range of motion deficit     Decreased  strength of left hand      Physician: Oleg Vidal MD       Physician Orders: Note   Eval and treat to begin ROM left distal radius fracture treated nonoperatively         Medical Diagnosis: S52.572D (ICD-10-CM) - Other closed intra-articular fracture of distal end of left radius with routine healing, subsequent encounter      Surgical Procedure and Date: N/A  Evaluation Date: 12/11/2018  Insurance: [employer]  Insurance Authorization period Expiration: 12/31/2018      Plan of Care Certification Period: 12/11/2018 to 2/11/2019     Visit # / Visits Authortized: 3 / 20  Time In:9:00 AM  Time Out: 10:00 AM  Total Billable Time: 55 minutes  Charges: fluido, 3 TE     Precautions: Standard        Subjective     Pt reports: she was compliant with home exercise program given last session.   Response to previous treatment:"moving much better"  Functional change: Pt reports she feels like she is getting stronger. She is using her left hand for all activities except yoga. Pt d/c brace yesterday.    Pain: 3/10  Location: left wrist      Objective   Observation/Appearance:  Skin intact and no bruising noted     Edema. Measured in centimeters.    12/11/2018 12/11/2018     Left Right   Proximal Wrist Crease 15.3 14.8   Mid palm 18 18.5   MCPs 17.2 18            Hand ROM. Measured in degrees. WNL     Wrist ROM  Date 12/11/2018 12/11/2018     Left Right   Supination/Pronation 80/80 80/80   Wrist ext/flex 50/65 60/70   Wrist RD/UD 20/25 20/35            Sensation: Intact      Strength (Dyanmometer) and Pinch Strength (Pinch Gauge)  Measured in pounds and psi. Average of three trials.    12/11/2018 12/11/2018     Left Right   Rung II 33 78         Irasema received the " following supervised modalities after being cleared for contradictions for 15 minutes:   -Patient received fluidotherapy to left hand for 15 minutes to increase blood flow, circulation, desensitization, sensory re-education and for pain management.      Irasema received the following manual therapy techniques for 5 minutes:   -STM to left hand/wrist     Irasema received therapeutic exercises for 40 minutes including:  -AROM as follows: Wrist flex/ext, RD/UD (open and closed fist) and sup/pron x 10 reps each  - green theraputty as follows: 3' molding, 30 grasps  -3 lb wrist 3 ways 2 x 15 reps  -green powerweb wrist extension pushes x 30 reps  -green gripper x 30 reps   -rung 3 hand gripper x 30 reps  - rotation bar with 2 plates x 15 reps  -wrist roller with 2 lb x 5 reps  -red powerflex (smiles, frowns and twists) x 30 reps        Home Exercises and Education Provided     Education provided:   - None today  - Progress towards goals: Excellent     Written Home Exercises Provided: Patient instructed to cont prior HEP.  Exercises were reviewed and Irasema was able to demonstrate them prior to the end of the session.  Irasema demonstrated good  understanding of the education provided.     See EMR under Patient Instructions for exercises provided prior visit.     Irasema demonstrated good  understanding of the education provided.         Assessment   Irasema Brand is a 48 y.o. female referred to outpatient occupational/hand therapy and presents with a medical diagnosis of 52.572D (ICD-10-CM) - Other closed intra-articular fracture of distal end of left radius with routine healing, subsequent encounter, resulting in left wrist pain, decreased range of motion, decreased strength and decreased functional use of the left hand. Advanced strengthening ex with no increase in pain. Pain has decreased and pt is becoming more funtional.    Pt would continue to benefit from skilled OT.      Irasema is progressing well towards her goals and  there are no updates to goals at this time. Pt prognosis is Excellent.     Pt will continue to benefit from skilled outpatient occupational therapy to address the deficits listed in the problem list on initial evaluation provide pt/family education and to maximize pt's level of independence in the home and community environment.     Anticipated barriers to occupational therapy: none    Pt's spiritual, cultural and educational needs considered and pt agreeable to plan of care and goals.    Goals:  Short Term (4 weeks on 1/10/2019):  1)   Patient to be IND with HEP and modalities for pain management. Met 12/21/2018  2)   Increase ROM 5-10 degrees for wrist ext/flex  to increase functional hand use for grasping.-progressing  3)   Increase  strength 10-15 lbs. to grasp lift objects.-progressing  4)   Decrease edema .2-.3 cm to increase joint mobility /flexibility for improved overall functional hand use.-progressing         Long Term (by discharge):  1)   Pt will report 0 out of 10 pain with yoga and pilates.-progressing  2)   Patient to score at 34% or less on FOTO to demonstrate improved perception of functional left hand use.-progressing  3)   Pt will return to prior level of function for ADLs and household management. -progressing            Plan: Re-assess next session.   Certification Period/Plan of care expiration: 12/11/2018 to 2/11/2019.     Outpatient Occupational Therapy 1 times weekly for 8 weeks may include the following interventions: Paraffin, Fluidotherapy, Manual therapy/joint mobilizations, Modalities for pain management, US 3 mhz, Therapeutic exercises/activities., Strengthening and Edema Control.          Discussed Plan of Care with patient: Yes  Updates/Grading for next session: Advance as tolerated.      Ivana Woodruff, OT

## 2019-01-22 ENCOUNTER — PATIENT MESSAGE (OUTPATIENT)
Dept: ORTHOPEDICS | Facility: CLINIC | Age: 49
End: 2019-01-22

## 2019-01-22 ENCOUNTER — TELEPHONE (OUTPATIENT)
Dept: ORTHOPEDICS | Facility: CLINIC | Age: 49
End: 2019-01-22

## 2019-01-22 NOTE — TELEPHONE ENCOUNTER
Called to confirm appt on 1/24/2019 with Dr Vidal at [facility]. Was unable to leave a voicemail due to the mailbox being full. Will send an epic message.

## 2019-01-23 ENCOUNTER — CLINICAL SUPPORT (OUTPATIENT)
Dept: REHABILITATION | Facility: HOSPITAL | Age: 49
End: 2019-01-23
Attending: ORTHOPAEDIC SURGERY
Payer: COMMERCIAL

## 2019-01-23 DIAGNOSIS — M25.60 RANGE OF MOTION DEFICIT: ICD-10-CM

## 2019-01-23 DIAGNOSIS — R29.898 DECREASED GRIP STRENGTH OF LEFT HAND: ICD-10-CM

## 2019-01-23 DIAGNOSIS — M25.532 PAIN IN LEFT WRIST: ICD-10-CM

## 2019-01-23 PROCEDURE — 97110 THERAPEUTIC EXERCISES: CPT

## 2019-01-23 PROCEDURE — 97022 WHIRLPOOL THERAPY: CPT

## 2019-01-23 NOTE — PROGRESS NOTES
"                            Occupational Therapy Discharge Note     Date: 1/23/2019  Name: Irasema Brand  Clinic Number: [id number]    Therapy Diagnosis:   Encounter Diagnoses   Name Primary?    Pain in left wrist     Range of motion deficit     Decreased  strength of left hand      Physician: Oleg Vidal MD       Physician Orders: Note   Eval and treat to begin ROM left distal radius fracture treated nonoperatively         Medical Diagnosis: S52.572D (ICD-10-CM) - Other closed intra-articular fracture of distal end of left radius with routine healing, subsequent encounter      Surgical Procedure and Date: N/A  Evaluation Date: 12/11/2018  Insurance: [employer]  Insurance Authorization period Expiration: 12/31/2018      Plan of Care Certification Period: 12/11/2018 to 2/11/2019     Visit # / Visits Authortized: 4 / 20  Time In:12:00 PM  Time Out: 1:00 PM  Total Billable Time: 55 minutes  Charges: fluido, 3 TE     Precautions: Standard        Subjective     Pt reports: she was compliant with home exercise program given last session.   Response to previous treatment:"moving much better"  Functional change:  She is using her left hand for all activities except yoga.     Pain: 0/10, with pressure 2/10  Location: left wrist      Objective   Observation/Appearance:  Skin intact and no bruising noted     Edema. Measured in centimeters.    12/11/2018 12/11/2018 1/23/2019     Left Right Left   Proximal Wrist Crease 15.3 14.8 14.8   Mid palm 18 18.5 17.8   MCPs 17.2 18 17.2            Hand ROM. Measured in degrees. WNL     Wrist ROM  Date 12/11/2018 12/11/2018 1/23/2019     Left Right Left   Supination/Pronation 80/80 80/80 80/80   Wrist ext/flex 50/65 60/70 65//70   Wrist RD/UD 20/25 20/35 20/35            Sensation: Intact      Strength (Dyanmometer) and Pinch Strength (Pinch Gauge)  Measured in pounds and psi. Average of three trials.    12/11/2018 12/11/2018 1/23/2018     Left Right Left   Rung II 33 78 60 "         CMS Impairment/Limitation/Restriction for FOTO Hand Survey    Therapist reviewed FOTO scores for Irasema Brand on 1/23/2019.   FOTO documents entered into [employer] - see Media section.    Limitation Score: 21%  Category: Carrying    Current : CJ = at least 20% but < 40% impaired, limited or restricted  Goal: CJ = at least 20% but < 40% impaired, limited or restricted  Discharge: CJ = at least 20% but < 40% impaired, limited or restricted           Irasema received the following supervised modalities after being cleared for contradictions for 15 minutes:   -Patient received fluidotherapy to left hand for 15 minutes to increase blood flow, circulation, desensitization, sensory re-education and for pain management.      Irasema received the following manual therapy techniques for 5 minutes:   -STM to left hand/wrist     Irasema received therapeutic exercises for 40 minutes including:  -AROM as follows: Wrist flex/ext, RD/UD (open and closed fist) and sup/pron x 10 reps each  - green theraputty as follows: 3' molding, 30 grasps  -4 lb wrist 3 ways 2 x 15 reps  -blue powerweb wrist extension pushes x 30 reps  -blue gripper x 30 reps   -rung 3 hand gripper x 30 reps  - rotation bar with 3 plates x 15 reps  -wrist roller with 2 lb x 5 reps  -green powerflex (smiles, frowns and twists) x 30 reps        Home Exercises and Education Provided     Education provided:   - None today  - Progress towards goals: Excellent     Written Home Exercises Provided: Patient instructed to cont prior HEP.  Exercises were reviewed and Irasema was able to demonstrate them prior to the end of the session.  Irasema demonstrated good  understanding of the education provided.     See EMR under Patient Instructions for exercises provided prior visit.     Irasema demonstrated good  understanding of the education provided.         Assessment   Irasema Brand is a 48 y.o. female referred to outpatient occupational/hand therapy and presents with a medical  diagnosis of 52.572D (ICD-10-CM) - Other closed intra-articular fracture of distal end of left radius with routine healing, subsequent encounter, resulting in left wrist pain, decreased range of motion, decreased strength and decreased functional use of the left hand. Advanced strengthening ex with no increase in pain. Pt has made significant progress with AROM,  strength and decreasing edema. She reports that she is performing all tasks without difficulty except for yoga. Pt wants to consult with physician first.    Pt would continue to benefit from skilled OT.      Irasema is progressing well towards her goals and there are no updates to goals at this time. Pt prognosis is Excellent.     Pt will continue to benefit from skilled outpatient occupational therapy to address the deficits listed in the problem list on initial evaluation provide pt/family education and to maximize pt's level of independence in the home and community environment.     Anticipated barriers to occupational therapy: none    Pt's spiritual, cultural and educational needs considered and pt agreeable to plan of care and goals.    Goals:  Short Term (4 weeks on 1/10/2019):  1)   Patient to be IND with HEP and modalities for pain management.Met 12/21/2018  2)   Increase ROM 5-10 degrees for wrist ext/flex  to increase functional hand use for grasping. Met 1/23/2019  3)   Increase  strength 10-15 lbs. to grasp lift objects. Met 1/23/2019  4)   Decrease edema .2-.3 cm to increase joint mobility /flexibility for improved overall functional hand use.-Met 1/23/2019         Long Term (by discharge):  1)   Pt will report 0 out of 10 pain with yoga and pilates.-progressing  2)   Patient to score at 34% or less on FOTO to demonstrate improved perception of functional left hand use.-progressing  3)   Pt will return to prior level of function for ADLs and household management. Met 1/23/2019            Plan: Consult with physician. Recommend d/c.    Certification Period/Plan of care expiration: 12/11/2018 to 2/11/2019.     Outpatient Occupational Therapy 1 times weekly for 8 weeks may include the following interventions: Paraffin, Fluidotherapy, Manual therapy/joint mobilizations, Modalities for pain management, US 3 mhz, Therapeutic exercises/activities., Strengthening and Edema Control.          Discussed Plan of Care with patient: Yes  Updates/Grading for next session: Advance as tolerated.      Ivana Woodruff, OT

## 2019-01-24 ENCOUNTER — OFFICE VISIT (OUTPATIENT)
Dept: ORTHOPEDICS | Facility: CLINIC | Age: 49
End: 2019-01-24
Payer: COMMERCIAL

## 2019-01-24 ENCOUNTER — HOSPITAL ENCOUNTER (OUTPATIENT)
Dept: RADIOLOGY | Facility: HOSPITAL | Age: 49
Discharge: HOME | End: 2019-01-24
Attending: ORTHOPAEDIC SURGERY
Payer: COMMERCIAL

## 2019-01-24 VITALS — BODY MASS INDEX: 25.83 KG/M2 | WEIGHT: 155 LBS | HEIGHT: 65 IN

## 2019-01-24 DIAGNOSIS — S52.572D OTHER CLOSED INTRA-ARTICULAR FRACTURE OF DISTAL END OF LEFT RADIUS WITH ROUTINE HEALING, SUBSEQUENT ENCOUNTER: Primary | ICD-10-CM

## 2019-01-24 DIAGNOSIS — S52.572D OTHER CLOSED INTRA-ARTICULAR FRACTURE OF DISTAL END OF LEFT RADIUS WITH ROUTINE HEALING, SUBSEQUENT ENCOUNTER: ICD-10-CM

## 2019-01-24 PROCEDURE — 99999 PR PBB SHADOW E&M-EST. PATIENT-LVL III: ICD-10-PCS | Mod: PBBFAC,,, | Performed by: ORTHOPAEDIC SURGERY

## 2019-01-24 PROCEDURE — 73110 X-RAY EXAM OF WRIST: CPT | Mod: 26,LT,, | Performed by: RADIOLOGY

## 2019-01-24 PROCEDURE — 99213 PR OFFICE/OUTPT VISIT, EST, LEVL III, 20-29 MIN: ICD-10-PCS | Mod: S$GLB,,, | Performed by: ORTHOPAEDIC SURGERY

## 2019-01-24 PROCEDURE — 99999 PR PBB SHADOW E&M-EST. PATIENT-LVL III: CPT | Mod: PBBFAC,,, | Performed by: ORTHOPAEDIC SURGERY

## 2019-01-24 PROCEDURE — 73110 X-RAY EXAM OF WRIST: CPT | Mod: TC,LT

## 2019-01-24 PROCEDURE — 73110 XR WRIST COMPLETE 3 VIEWS LEFT: ICD-10-PCS | Mod: 26,LT,, | Performed by: RADIOLOGY

## 2019-01-24 PROCEDURE — 99213 OFFICE O/P EST LOW 20 MIN: CPT | Mod: S$GLB,,, | Performed by: ORTHOPAEDIC SURGERY

## 2019-01-24 NOTE — PROGRESS NOTES
[facility]  History & Physical  Orthopedics    SUBJECTIVE:      Chief Complaint: Left wrist pain    Referring Provider: No ref. provider found     History of Present Illness:  Patient is a 48 y.o. right hand dominant female who presents today with complaints of left wrist pain.  She reports slip and fall on wet ground outside 10/20/18.  She denies N/T and presents for evaluation.     Interval history 11/15/18: She returns today for reevaluation.  She has been in a [place] cast which she tolerated well.  No N/T or new complaints.    Interval history 12/6/18: She returns today for reevaluation.  She has been in a SAC and doing well.  No new complaints, denies N/T.     Interval History 1/24/19: has been out of SAC doing well. Has completed OT. Minimal pain in the left wrist, no paresthesias. Now complains of right dorsal wrist pain with pronation and supination. She is otherwise well. Would like to return to tennis if possible.      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    Symptoms are aggravated by movement.    Symptoms are alleviated by rest.    Symptoms consist of pain.    The patient rates their pain as a 3/10.    Attempted treatment(s) and/or interventions include rest and splinting/casting.     The patient denies any fevers, chills, N/V, D/C and presents for evaluation.       Past Medical History:   Diagnosis Date    GERD (gastroesophageal reflux disease)     Insulin resistance      Past Surgical History:   Procedure Laterality Date    bladder mesh      BLADDER SUSPENSION      TONSILLECTOMY       Review of patient's allergies indicates:   Allergen Reactions    Hydromorphone Hives    Hydromorphone hcl Hives    Tramadol Nausea Only     Social History     Social History Narrative    Not on file     Family History   Problem Relation Age of Onset    Birth defects Maternal Grandfather     Birth defects Paternal Grandmother      Birth defects Paternal Grandfather     No Known Problems Mother     Coronary artery disease Father          Current Outpatient Medications:     acyclovir (ZOVIRAX) 400 MG tablet, , Disp: , Rfl:     diazePAM (VALIUM) 5 MG tablet, , Disp: , Rfl:     ENSKYCE 0.15-0.03 mg per tablet, , Disp: , Rfl:     ibuprofen (ADVIL,MOTRIN) 800 MG tablet, Take 1 tablet (800 mg total) by mouth every 8 (eight) hours as needed for Pain (with food)., Disp: 30 tablet, Rfl: 1    levocetirizine (XYZAL) 5 MG tablet, Take 5 mg by mouth., Disp: , Rfl:     metformin (GLUCOPHAGE) 1000 MG tablet, , Disp: , Rfl:     metFORMIN (GLUCOPHAGE) 1000 MG tablet, , Disp: , Rfl:     methenamine (HIPREX) 1 gram Tab, , Disp: , Rfl:     multivitamin (THERAGRAN) per tablet, Take 1 tablet by mouth., Disp: , Rfl:     nitrofurantoin (MACRODANTIN) 50 MG capsule, , Disp: , Rfl:     norethindrone-ethinyl estradiol (FEMHRT 1/5) 1-5 mg-mcg Tab, , Disp: , Rfl:     omeprazole (PRILOSEC) 20 MG capsule, , Disp: , Rfl:     omeprazole (PRILOSEC) 40 MG capsule, 1 capsule, Disp: , Rfl:     oxyCODONE-acetaminophen (PERCOCET) 5-325 mg per tablet, , Disp: , Rfl:     phenazopyridine (PYRIDIUM) 100 MG tablet, , Disp: , Rfl:     phentermine (ADIPEX-P) 37.5 mg tablet, TAKE 1 TABLET BY MOUTH EVERY MORNING BEFORE BREAKFAST, Disp: , Rfl:     promethazine-dextromethorphan (PROMETHAZINE-DM) 6.25-15 mg/5 mL Syrp, Take 5 mLs by mouth every 4 to 6 hours as needed (COUGH)., Disp: 118 mL, Rfl: 0    sertraline (ZOLOFT) 100 MG tablet, , Disp: , Rfl:     temazepam (RESTORIL) 30 mg capsule, , Disp: , Rfl:     testosterone cypionate (DEPOTESTOTERONE CYPIONATE) 200 mg/mL injection, , Disp: , Rfl:     tiZANidine (ZANAFLEX) 4 MG tablet, , Disp: , Rfl:     diclofenac sodium 1 % Gel, Apply 2 g topically 4 (four) times daily., Disp: 1 Tube, Rfl: 2      Review of Systems:  Constitutional: no fever or chills  Eyes: no visual changes  ENT: no nasal congestion or sore  "throat  Respiratory: no cough or shortness of breath  Cardiovascular: no chest pain  Gastrointestinal: no nausea or vomiting, tolerating diet  Musculoskeletal: pain    OBJECTIVE:      Vital Signs (Most Recent):  Vitals:    01/24/19 1517   Weight: 70.3 kg (154 lb 15.7 oz)   Height: 5' 5" (1.651 m)     Body mass index is 25.79 kg/m².      Physical Exam:  Constitutional: The patient appears well-developed and well-nourished. No distress.   Head: Normocephalic and atraumatic.   Nose: Nose normal.   Eyes: Conjunctivae and EOM are normal.   Neck: No tracheal deviation present.   Cardiovascular: Normal rate and intact distal pulses.    Pulmonary/Chest: Effort normal. No respiratory distress.   Abdominal: There is no guarding.   Neurological: The patient is alert.   Psychiatric: The patient has a normal mood and affect.     Left Hand/Wrist Examination:    Observation/Inspection:  Swelling  none    Deformity  none  Discoloration  none     Scars   none    Atrophy  none    HAND/WRIST EXAMINATION:  Finkelstein's Test   Neg  Snuff box tenderness   Neg  Loza's Test    Neg  Hook of Hamate Tenderness  Neg  CMC grind    Neg  Circumduction test   Neg    Neurovascular Exam:  Digits WWP, brisk CR < 3s throughout  NVI motor/LTS to M/R/U nerves, radial pulse 2+    Left wrist stiff  Minimal TTP at fracture site    Diagnostic Results:     Xray - Intraarticular nondisplaced, incomplete left distal radius fracture in good alignment with no significant change. Healing callus seen on XR.  EMG - none    ASSESSMENT/PLAN:      48 y.o. yo female with left distal radius fracture, nondisplaced. Now well healed. Tendonitis of extensor tendons right wrist.     1) Ok for WBAT LUE. Encouraged ROM and strengthen.   2) OTC nsaids for pain and for right wrist tendonits  3) f/u prn      Oleg Vidal M.D.  "

## 2021-04-16 ENCOUNTER — PATIENT MESSAGE (OUTPATIENT)
Dept: RESEARCH | Facility: HOSPITAL | Age: 51
End: 2021-04-16